# Patient Record
Sex: FEMALE | Race: WHITE | Employment: STUDENT | URBAN - METROPOLITAN AREA
[De-identification: names, ages, dates, MRNs, and addresses within clinical notes are randomized per-mention and may not be internally consistent; named-entity substitution may affect disease eponyms.]

---

## 2017-03-11 ENCOUNTER — APPOINTMENT (OUTPATIENT)
Dept: GENERAL RADIOLOGY | Age: 42
End: 2017-03-11
Attending: EMERGENCY MEDICINE
Payer: COMMERCIAL

## 2017-03-11 ENCOUNTER — HOSPITAL ENCOUNTER (EMERGENCY)
Age: 42
Discharge: HOME | End: 2017-03-11
Attending: EMERGENCY MEDICINE
Payer: COMMERCIAL

## 2017-03-11 VITALS
RESPIRATION RATE: 19 BRPM | BODY MASS INDEX: 27.78 KG/M2 | DIASTOLIC BLOOD PRESSURE: 90 MMHG | TEMPERATURE: 97.8 F | HEIGHT: 67 IN | WEIGHT: 177 LBS | OXYGEN SATURATION: 97 % | HEART RATE: 86 BPM | SYSTOLIC BLOOD PRESSURE: 129 MMHG

## 2017-03-11 DIAGNOSIS — S90.211A CONTUSION OF RIGHT GREAT TOE WITH DAMAGE TO NAIL, INITIAL ENCOUNTER: Primary | ICD-10-CM

## 2017-03-11 PROCEDURE — 74011250637 HC RX REV CODE- 250/637: Performed by: EMERGENCY MEDICINE

## 2017-03-11 PROCEDURE — 73660 X-RAY EXAM OF TOE(S): CPT

## 2017-03-11 PROCEDURE — 99283 EMERGENCY DEPT VISIT LOW MDM: CPT | Performed by: EMERGENCY MEDICINE

## 2017-03-11 PROCEDURE — 74011250636 HC RX REV CODE- 250/636: Performed by: EMERGENCY MEDICINE

## 2017-03-11 PROCEDURE — 96372 THER/PROPH/DIAG INJ SC/IM: CPT | Performed by: EMERGENCY MEDICINE

## 2017-03-11 RX ORDER — TRAMADOL HYDROCHLORIDE 50 MG/1
50 TABLET ORAL
Qty: 20 TAB | Refills: 0 | Status: SHIPPED | OUTPATIENT
Start: 2017-03-11 | End: 2019-10-15 | Stop reason: CLARIF

## 2017-03-11 RX ORDER — OMEPRAZOLE 20 MG/1
20 CAPSULE, DELAYED RELEASE ORAL DAILY
COMMUNITY
End: 2019-10-15 | Stop reason: CLARIF

## 2017-03-11 RX ORDER — TRAMADOL HYDROCHLORIDE 50 MG/1
50 TABLET ORAL
Status: COMPLETED | OUTPATIENT
Start: 2017-03-11 | End: 2017-03-11

## 2017-03-11 RX ORDER — HYDROCODONE BITARTRATE AND ACETAMINOPHEN 7.5; 325 MG/1; MG/1
1 TABLET ORAL
Status: COMPLETED | OUTPATIENT
Start: 2017-03-11 | End: 2017-03-11

## 2017-03-11 RX ORDER — KETOROLAC TROMETHAMINE 30 MG/ML
60 INJECTION, SOLUTION INTRAMUSCULAR; INTRAVENOUS
Status: COMPLETED | OUTPATIENT
Start: 2017-03-11 | End: 2017-03-11

## 2017-03-11 RX ADMIN — HYDROCODONE BITARTRATE AND ACETAMINOPHEN 1 TABLET: 7.5; 325 TABLET ORAL at 23:35

## 2017-03-11 RX ADMIN — TRAMADOL HYDROCHLORIDE 50 MG: 50 TABLET, FILM COATED ORAL at 23:27

## 2017-03-11 RX ADMIN — KETOROLAC TROMETHAMINE 60 MG: 30 INJECTION, SOLUTION INTRAMUSCULAR at 23:27

## 2017-03-12 NOTE — ED TRIAGE NOTES
Pt in c/o right great toe pain after dropping chair on it Thursday. Pt states bruising and swelling to toe. Pt in cast shoe on triage. Pt ambulatory to triage with limp.

## 2017-03-12 NOTE — ED PROVIDER NOTES
Patient is a 35 y.o. female presenting with toe pain. The history is provided by the patient. Toe Pain    This is a new problem. The current episode started 2 days ago. The problem occurs constantly. The problem has not changed since onset. The pain is present in the right toes. The quality of the pain is described as aching. The pain is at a severity of 8/10. The pain is severe. Pertinent negatives include no numbness, full range of motion, no stiffness, no tingling and no itching. The symptoms are aggravated by standing. She has tried OTC pain medications for the symptoms. The treatment provided no relief. There has been a history of trauma. Past Medical History:   Diagnosis Date    Achilles tendon sprain     right     Anal fissure 1/20/2015    Anxiety     BRCA negative     Endometriosis     Ines Barr virus positive mononucleosis syndrome     12-11    Insomnia     Obesity (BMI 30.0-34. 9)        Past Surgical History:   Procedure Laterality Date    HX ACL RECONSTRUCTION  2009    RIGHT    HX ACL RECONSTRUCTION  2014    right knee    HX APPENDECTOMY  2002    HX CHOLECYSTECTOMY  1999    [facility]    dx lap    HX KNEE ARTHROSCOPY  10/12    L knee scope    HX LUMBAR FUSION  2006    L5, S1 discectomy/fusion with a frontal approach    HX ORTHOPAEDIC  5/2011    left foot surg    HX OTHER SURGICAL  2004    left hand I&D    HX OTHER SURGICAL  5/2013    EUA- sphincterectomy    HX TOTAL LAPAROSCOPIC HYSTERECTOMY W/ BS&O  2/2013    hemmorhoidectomy    KNEE ARTHROSCP HARV  x 2 1990, 1993    RIGHT         Family History:   Problem Relation Age of Onset    Breast Cancer Mother 62    Breast Cancer Maternal Grandmother 64    Ovarian Cancer Other 39     Maternal great aunt    Malignant Hyperthermia Neg Hx     Pseudocholinesterase Deficiency Neg Hx     Delayed Awakening Neg Hx     Post-op Nausea/Vomiting Neg Hx     Emergence Delirium Neg Hx     Post-op Cognitive Dysfunction Neg Hx     Other Neg Hx     Colon Cancer Neg Hx        Social History     Social History    Marital status:      Spouse name: N/A    Number of children: N/A    Years of education: N/A     Occupational History    Not on file. Social History Main Topics    Smoking status: Never Smoker    Smokeless tobacco: Never Used    Alcohol use No    Drug use: No    Sexual activity: Yes     Partners: Male     Birth control/ protection: Surgical     Other Topics Concern    Not on file     Social History Narrative         ALLERGIES: Clomiphene    Review of Systems   Musculoskeletal: Negative for stiffness. Skin: Negative for itching. Neurological: Negative for tingling and numbness. All other systems reviewed and are negative. Vitals:    03/11/17 2117   BP: 129/90   Pulse: 86   Resp: 19   Temp: 97.8 °F (36.6 °C)   SpO2: 97%   Weight: 80.3 kg (177 lb)   Height: 5' 7\" (1.702 m)            Physical Exam   Constitutional: She is oriented to person, place, and time. She appears well-developed and well-nourished. No distress. HENT:   Head: Normocephalic and atraumatic. Eyes: Conjunctivae and EOM are normal. Pupils are equal, round, and reactive to light. Musculoskeletal: Normal range of motion. She exhibits edema and tenderness. She exhibits no deformity. Bruising noted at the base of the right great toe no deformities are noted unable to assess for subungual hematoma due to toenail polish. The patient denies any pain in the area of the toenail   Neurological: She is alert and oriented to person, place, and time. Skin: Skin is warm and dry. Psychiatric: She has a normal mood and affect. Her behavior is normal.   Nursing note and vitals reviewed.        MDM  Number of Diagnoses or Management Options  Contusion of right great toe with damage to nail, initial encounter:      Amount and/or Complexity of Data Reviewed  Tests in the radiology section of CPT®: ordered and reviewed (X-rays negative for fracture)    Risk of Complications, Morbidity, and/or Mortality  Presenting problems: low  Diagnostic procedures: low  Management options: low  General comments: Patient has a cast shoe    Patient Progress  Patient progress: stable    ED Course       Procedures

## 2018-12-11 ENCOUNTER — APPOINTMENT (OUTPATIENT)
Dept: CT IMAGING | Age: 43
End: 2018-12-11
Attending: EMERGENCY MEDICINE
Payer: COMMERCIAL

## 2018-12-11 ENCOUNTER — HOSPITAL ENCOUNTER (EMERGENCY)
Age: 43
Discharge: HOME | End: 2018-12-11
Attending: EMERGENCY MEDICINE
Payer: COMMERCIAL

## 2018-12-11 VITALS
WEIGHT: 190 LBS | RESPIRATION RATE: 24 BRPM | HEIGHT: 67 IN | OXYGEN SATURATION: 100 % | HEART RATE: 88 BPM | TEMPERATURE: 98.7 F | SYSTOLIC BLOOD PRESSURE: 127 MMHG | DIASTOLIC BLOOD PRESSURE: 83 MMHG | BODY MASS INDEX: 29.82 KG/M2

## 2018-12-11 DIAGNOSIS — R56.9 SEIZURE (HCC): Primary | ICD-10-CM

## 2018-12-11 LAB
ALBUMIN SERPL-MCNC: 3.3 G/DL (ref 3.5–5)
ALBUMIN/GLOB SERPL: 0.9 {RATIO}
ALP SERPL-CCNC: 78 U/L (ref 50–130)
ALT SERPL-CCNC: 16 U/L (ref 12–65)
ANION GAP SERPL CALC-SCNC: 7 MMOL/L
AST SERPL-CCNC: 16 U/L (ref 15–37)
ATRIAL RATE: 91 BPM
BASOPHILS # BLD: 0 K/UL (ref 0–0.2)
BASOPHILS NFR BLD: 0 % (ref 0–2)
BILIRUB SERPL-MCNC: 0.2 MG/DL (ref 0.2–1.1)
BUN SERPL-MCNC: 15 MG/DL (ref 6–23)
CALCIUM SERPL-MCNC: 8.4 MG/DL (ref 8.3–10.4)
CALCULATED P AXIS, ECG09: 69 DEGREES
CALCULATED R AXIS, ECG10: 87 DEGREES
CALCULATED T AXIS, ECG11: 73 DEGREES
CHLORIDE SERPL-SCNC: 102 MMOL/L (ref 98–107)
CO2 SERPL-SCNC: 30 MMOL/L (ref 21–32)
CREAT SERPL-MCNC: 0.9 MG/DL (ref 0.6–1)
DIAGNOSIS, 93000: NORMAL
DIFFERENTIAL METHOD BLD: ABNORMAL
EOSINOPHIL # BLD: 0.1 K/UL (ref 0–0.8)
EOSINOPHIL NFR BLD: 2 % (ref 0.5–7.8)
ERYTHROCYTE [DISTWIDTH] IN BLOOD BY AUTOMATED COUNT: 12.9 % (ref 11.9–14.6)
GLOBULIN SER CALC-MCNC: 3.5 G/DL (ref 2.3–3.5)
GLUCOSE SERPL-MCNC: 111 MG/DL (ref 65–100)
HCT VFR BLD AUTO: 39.9 % (ref 35.8–46.3)
HGB BLD-MCNC: 12.9 G/DL (ref 11.7–15.4)
IMM GRANULOCYTES # BLD: 0 K/UL (ref 0–0.5)
IMM GRANULOCYTES NFR BLD AUTO: 0 % (ref 0–5)
LYMPHOCYTES # BLD: 1.3 K/UL (ref 0.5–4.6)
LYMPHOCYTES NFR BLD: 17 % (ref 13–44)
MAGNESIUM SERPL-MCNC: 2.2 MG/DL (ref 1.8–2.4)
MCH RBC QN AUTO: 27.3 PG (ref 26.1–32.9)
MCHC RBC AUTO-ENTMCNC: 32.3 G/DL (ref 31.4–35)
MCV RBC AUTO: 84.5 FL (ref 79.6–97.8)
MONOCYTES # BLD: 0.4 K/UL (ref 0.1–1.3)
MONOCYTES NFR BLD: 5 % (ref 4–12)
NEUTS SEG # BLD: 5.6 K/UL (ref 1.7–8.2)
NEUTS SEG NFR BLD: 76 % (ref 43–78)
NRBC # BLD: 0 K/UL (ref 0–0.2)
P-R INTERVAL, ECG05: 158 MS
PLATELET # BLD AUTO: 271 K/UL (ref 150–450)
PMV BLD AUTO: 9.2 FL (ref 9.4–12.3)
POTASSIUM SERPL-SCNC: 4.4 MMOL/L (ref 3.5–5.1)
PROT SERPL-MCNC: 6.8 G/DL
Q-T INTERVAL, ECG07: 358 MS
QRS DURATION, ECG06: 102 MS
QTC CALCULATION (BEZET), ECG08: 440 MS
RBC # BLD AUTO: 4.72 M/UL (ref 4.05–5.2)
SODIUM SERPL-SCNC: 139 MMOL/L (ref 136–145)
TROPONIN I BLD-MCNC: 0 NG/ML (ref 0.02–0.05)
VENTRICULAR RATE, ECG03: 91 BPM
WBC # BLD AUTO: 7.4 K/UL (ref 4.3–11.1)

## 2018-12-11 PROCEDURE — 70450 CT HEAD/BRAIN W/O DYE: CPT

## 2018-12-11 PROCEDURE — 94762 N-INVAS EAR/PLS OXIMTRY CONT: CPT | Performed by: EMERGENCY MEDICINE

## 2018-12-11 PROCEDURE — 74011250637 HC RX REV CODE- 250/637: Performed by: EMERGENCY MEDICINE

## 2018-12-11 PROCEDURE — 84484 ASSAY OF TROPONIN QUANT: CPT

## 2018-12-11 PROCEDURE — 83735 ASSAY OF MAGNESIUM: CPT

## 2018-12-11 PROCEDURE — 80053 COMPREHEN METABOLIC PANEL: CPT

## 2018-12-11 PROCEDURE — 99285 EMERGENCY DEPT VISIT HI MDM: CPT | Performed by: EMERGENCY MEDICINE

## 2018-12-11 PROCEDURE — 85025 COMPLETE CBC W/AUTO DIFF WBC: CPT

## 2018-12-11 PROCEDURE — 81003 URINALYSIS AUTO W/O SCOPE: CPT | Performed by: EMERGENCY MEDICINE

## 2018-12-11 PROCEDURE — 93005 ELECTROCARDIOGRAM TRACING: CPT | Performed by: EMERGENCY MEDICINE

## 2018-12-11 RX ORDER — MIRTAZAPINE 30 MG/1
TABLET, FILM COATED ORAL
COMMUNITY
End: 2019-10-15 | Stop reason: CLARIF

## 2018-12-11 RX ORDER — SODIUM CHLORIDE 0.9 % (FLUSH) 0.9 %
5-10 SYRINGE (ML) INJECTION AS NEEDED
Status: DISCONTINUED | OUTPATIENT
Start: 2018-12-11 | End: 2018-12-11 | Stop reason: HOSPADM

## 2018-12-11 RX ORDER — SODIUM CHLORIDE 0.9 % (FLUSH) 0.9 %
5-10 SYRINGE (ML) INJECTION EVERY 8 HOURS
Status: DISCONTINUED | OUTPATIENT
Start: 2018-12-11 | End: 2018-12-11 | Stop reason: HOSPADM

## 2018-12-11 RX ORDER — LEVETIRACETAM 500 MG/1
500 TABLET ORAL 2 TIMES DAILY
Qty: 60 TAB | Refills: 1 | Status: SHIPPED | OUTPATIENT
Start: 2018-12-11 | End: 2019-10-15 | Stop reason: CLARIF

## 2018-12-11 RX ORDER — LEVETIRACETAM 500 MG/1
500 TABLET ORAL
Status: COMPLETED | OUTPATIENT
Start: 2018-12-11 | End: 2018-12-11

## 2018-12-11 RX ADMIN — LEVETIRACETAM 500 MG: 500 TABLET, FILM COATED ORAL at 17:09

## 2018-12-11 NOTE — ED NOTES
I have reviewed discharge instructions with the patient and spouse. The patient and spouse verbalized understanding. Patient left ED via Discharge Method: ambulatory to Home with spouse. Opportunity for questions and clarification provided. Patient given 1 scripts. To continue your aftercare when you leave the hospital, you may receive an automated call from our care team to check in on how you are doing. This is a free service and part of our promise to provide the best care and service to meet your aftercare needs.  If you have questions, or wish to unsubscribe from this service please call [phone]. Thank you for Choosing our [facility].

## 2018-12-11 NOTE — DISCHARGE INSTRUCTIONS

## 2018-12-11 NOTE — ED TRIAGE NOTES
Patient with first time seizure lasting ab out 35-45 seconds per  who witnessed patient. Patient denies any complaints of pain at this time. Patient with BGL at 174 mg/dl with EMS. Patient advises gastric sleeve couple years ago, and was having some constipation prior. Patient with multiple medications changes recently for insomnia. Patient alert and oriented at this time.

## 2018-12-11 NOTE — PROGRESS NOTES
Initial visit by  in ER to convey care and concern and encourage patient that  services are available if desired. No needs were voiced during the visit.      Magen Stern 66  Board Certified

## 2018-12-11 NOTE — ED PROVIDER NOTES
The history is provided by the patient and the spouse. The history is limited by the condition of the patient. Seizure    This is a new problem. The current episode started 1 to 2 hours ago. The problem has been resolved. There was 1 seizure. The most recent episode lasted 30 to 120 seconds. Associated symptoms include vomiting (15 minutes before onset of seizure; h/o gastric sleeve and intermittent problems with this for years. ). Pertinent negatives include no confusion, no headaches, no speech difficulty, no visual disturbance, no neck stiffness, no sore throat, no chest pain, no cough, no diarrhea and no muscle weakness. Characteristics include rhythmic jerking, loss of consciousness, apnea and cyanosis (perioral). Characteristics do not include eye blinking, eye deviation, bowel incontinence, bladder incontinence or bit tongue. The episode was witnessed. There was no sensation of an aura present. The seizures did not continue in the ED. The seizure(s) had no focality. Possible causes include sleep deprivation (chronic and unchanged). Possible causes do not include medication or dosage change, missed seizure meds, recent illness, change in alcohol use, stress, head injury or missed seizure meds. There has been no fever. She reports vomiting (15 minutes before onset of seizure; h/o gastric sleeve and intermittent problems with this for years. ). She reports no chest pain, no confusion, no visual disturbance, no diarrhea, no headaches, no sore throat, no muscle weakness, no stiff neck, no speech difficulty, and no cough. There were no medications administered prior to arrival. Home seizure medications include: no seizure medications. Past Medical History:   Diagnosis Date    Achilles tendon sprain     right     Anal fissure 1/20/2015    Anxiety     BRCA negative     Endometriosis     Ines Barr virus positive mononucleosis syndrome     12-11    Insomnia     Obesity (BMI 30.0-34. 9)        Past Surgical History:   Procedure Laterality Date    HX ACL RECONSTRUCTION  2009    RIGHT    HX ACL RECONSTRUCTION  2014    right knee    HX APPENDECTOMY  2002    HX CHOLECYSTECTOMY  1999    HX GASTRIC BYPASS      gastric sleeve    HX HYSTERECTOMY      HX KNEE ARTHROSCOPY  10/12    L knee scope    HX LUMBAR FUSION  2006    L5, S1 discectomy/fusion with a frontal approach    HX ORTHOPAEDIC  5/2011    left foot surg    HX OTHER SURGICAL  2004    left hand I&D    HX OTHER SURGICAL  5/2013    EUA- sphincterectomy    HX TOTAL LAPAROSCOPIC HYSTERECTOMY W/ BS&O  2/2013    hemmorhoidectomy    KNEE ARTHROSCP HARV  x 2 1990, 1993    RIGHT         Family History:   Problem Relation Age of Onset    Breast Cancer Mother 62    Breast Cancer Maternal Grandmother 64    Ovarian Cancer Other 39        Maternal great aunt   [facility] Malignant Hyperthermia Neg Hx     Pseudocholinesterase Deficiency Neg Hx     Delayed Awakening Neg Hx     Post-op Nausea/Vomiting Neg Hx     Emergence Delirium Neg Hx     Post-op Cognitive Dysfunction Neg Hx     Other Neg Hx     Colon Cancer Neg Hx        Social History     Socioeconomic History    Marital status:      Spouse name: Not on file    Number of children: Not on file    Years of education: Not on file    Highest education level: Not on file   Social Needs    Financial resource strain: Not on file    Food insecurity - worry: Not on file    Food insecurity - inability: Not on file   [employer] needs - medical: Not on file   [employer] needs - non-medical: Not on file   Occupational History    Not on file   Tobacco Use    Smoking status: Never Smoker    Smokeless tobacco: Never Used   Substance and Sexual Activity    Alcohol use: Yes     Comment: rare wine    Drug use: No    Sexual activity: Yes     Partners: Male     Birth control/protection: Surgical   Other Topics Concern    Not on file   Social History Narrative    Not on file         ALLERGIES: Clomiphene    Review of Systems   Constitutional: Negative for diaphoresis and fever. HENT: Negative for sore throat. Eyes: Negative for visual disturbance. Respiratory: Positive for apnea. Negative for cough. Cardiovascular: Positive for cyanosis (perioral). Negative for chest pain. Gastrointestinal: Positive for vomiting (15 minutes before onset of seizure; h/o gastric sleeve and intermittent problems with this for years. ). Negative for abdominal pain, bowel incontinence and diarrhea. Genitourinary: Negative for bladder incontinence. Musculoskeletal: Negative for back pain. Neurological: Positive for loss of consciousness. Negative for speech difficulty, weakness and headaches. Psychiatric/Behavioral: Negative for confusion. All other systems reviewed and are negative. Vitals:    12/11/18 1402   BP: 114/77   Pulse: 100   Resp: 16   Temp: 99 °F (37.2 °C)   SpO2: 96%   Weight: 86.2 kg (190 lb)   Height: 5' 7\" (1.702 m)            Physical Exam   Constitutional: She is oriented to person, place, and time. She appears well-developed and well-nourished. No distress. HENT:   Head: Normocephalic and atraumatic. Right Ear: External ear normal.   Left Ear: External ear normal.   Mouth/Throat: Oropharynx is clear and moist.   Eyes: Conjunctivae and EOM are normal. Pupils are equal, round, and reactive to light. Neck: Normal range of motion. Neck supple. Cardiovascular: Normal rate, regular rhythm, normal heart sounds and intact distal pulses. Pulmonary/Chest: Effort normal and breath sounds normal.   Abdominal: Soft. Bowel sounds are normal. There is no tenderness. Musculoskeletal: Normal range of motion. She exhibits no edema. Neurological: She is alert and oriented to person, place, and time. She has normal strength. She displays normal reflexes. No cranial nerve deficit or sensory deficit. She exhibits normal muscle tone.  Coordination normal.   Negative pronator drift   Skin: Skin is warm and dry. Capillary refill takes less than 2 seconds. Psychiatric: She has a normal mood and affect. Her speech is normal.   Nursing note and vitals reviewed. MDM  Number of Diagnoses or Management Options  Seizure [facility]): new and requires workup     Amount and/or Complexity of Data Reviewed  Clinical lab tests: ordered and reviewed  Tests in the radiology section of CPT®: ordered and reviewed  Obtain history from someone other than the patient: yes  Review and summarize past medical records: yes  Independent visualization of images, tracings, or specimens: yes    Risk of Complications, Morbidity, and/or Mortality  Presenting problems: high  Diagnostic procedures: moderate  Management options: moderate    Patient Progress  Patient progress: improved         Procedures    Results Reviewed:      Recent Results (from the past 24 hour(s))   CBC WITH AUTOMATED DIFF    Collection Time: 12/11/18  3:28 PM   Result Value Ref Range    WBC 7.4 4.3 - 11.1 K/uL    RBC 4.72 4.05 - 5.2 M/uL    HGB 12.9 11.7 - 15.4 g/dL    HCT 39.9 35.8 - 46.3 %    MCV 84.5 79.6 - 97.8 FL    MCH 27.3 26.1 - 32.9 PG    MCHC 32.3 31.4 - 35.0 g/dL    RDW 12.9 11.9 - 14.6 %    PLATELET 146 093 - 697 K/uL    MPV 9.2 (L) 9.4 - 12.3 FL    ABSOLUTE NRBC 0.00 0.0 - 0.2 K/uL    DF AUTOMATED      NEUTROPHILS 76 43 - 78 %    LYMPHOCYTES 17 13 - 44 %    MONOCYTES 5 4.0 - 12.0 %    EOSINOPHILS 2 0.5 - 7.8 %    BASOPHILS 0 0.0 - 2.0 %    IMMATURE GRANULOCYTES 0 0.0 - 5.0 %    ABS. NEUTROPHILS 5.6 1.7 - 8.2 K/UL    ABS. LYMPHOCYTES 1.3 0.5 - 4.6 K/UL    ABS. MONOCYTES 0.4 0.1 - 1.3 K/UL    ABS. EOSINOPHILS 0.1 0.0 - 0.8 K/UL    ABS. BASOPHILS 0.0 0.0 - 0.2 K/UL    ABS. IMM.  GRANS. 0.0 0.0 - 0.5 K/UL   METABOLIC PANEL, COMPREHENSIVE    Collection Time: 12/11/18  3:28 PM   Result Value Ref Range    Sodium 139 136 - 145 mmol/L    Potassium 4.4 3.5 - 5.1 mmol/L    Chloride 102 98 - 107 mmol/L    CO2 30 21 - 32 mmol/L    Anion gap 7 mmol/L    Glucose 111 (H) 65 - 100 mg/dL    BUN 15 6 - 23 MG/DL    Creatinine 0.90 0.6 - 1.0 MG/DL    GFR est AA >60 >60 ml/min/1.73m2    GFR est non-AA >60 ml/min/1.73m2    Calcium 8.4 8.3 - 10.4 MG/DL    Bilirubin, total 0.2 0.2 - 1.1 MG/DL    ALT (SGPT) 16 12 - 65 U/L    AST (SGOT) 16 15 - 37 U/L    Alk. phosphatase 78 50 - 130 U/L    Protein, total 6.8 g/dL    Albumin 3.3 (L) 3.5 - 5.0 g/dL    Globulin 3.5 2.3 - 3.5 g/dL    A-G Ratio 0.9     MAGNESIUM    Collection Time: 12/11/18  3:28 PM   Result Value Ref Range    Magnesium 2.2 1.8 - 2.4 mg/dL   POC TROPONIN-I    Collection Time: 12/11/18  3:38 PM   Result Value Ref Range    Troponin-I (POC) 0 (L) 0.02 - 0.05 ng/ml     CT HEAD WO CONT   Final Result   IMPRESSION:  Negative for acute intracranial abnormality. I discussed the results of all labs, procedures, radiographs, and treatments with the patient and available family. Treatment plan is agreed upon and the patient is ready for discharge. All voiced understanding of the discharge plan and medication instructions or changes as appropriate. Questions about treatment in the ED were answered. All were encouraged to return should symptoms worsen or new problems develop.

## 2019-10-16 ENCOUNTER — ANESTHESIA EVENT (OUTPATIENT)
Dept: SURGERY | Age: 44
End: 2019-10-16
Payer: COMMERCIAL

## 2019-10-16 ENCOUNTER — HOSPITAL ENCOUNTER (OUTPATIENT)
Age: 44
Setting detail: OUTPATIENT SURGERY
Discharge: HOME | End: 2019-10-16
Attending: ORTHOPAEDIC SURGERY | Admitting: ORTHOPAEDIC SURGERY
Payer: COMMERCIAL

## 2019-10-16 ENCOUNTER — ANESTHESIA (OUTPATIENT)
Dept: SURGERY | Age: 44
End: 2019-10-16
Payer: COMMERCIAL

## 2019-10-16 VITALS
RESPIRATION RATE: 16 BRPM | OXYGEN SATURATION: 95 % | SYSTOLIC BLOOD PRESSURE: 114 MMHG | DIASTOLIC BLOOD PRESSURE: 65 MMHG | HEART RATE: 57 BPM | WEIGHT: 188 LBS | BODY MASS INDEX: 29.44 KG/M2 | TEMPERATURE: 98 F

## 2019-10-16 PROCEDURE — 77030018836 HC SOL IRR NACL ICUM -A: Performed by: ORTHOPAEDIC SURGERY

## 2019-10-16 PROCEDURE — 77030040936 HC WND COBLATN S&N -C: Performed by: ORTHOPAEDIC SURGERY

## 2019-10-16 PROCEDURE — 77030000032 HC CUF TRNQT ZIMM -B: Performed by: ORTHOPAEDIC SURGERY

## 2019-10-16 PROCEDURE — 76060000032 HC ANESTHESIA 0.5 TO 1 HR: Performed by: ORTHOPAEDIC SURGERY

## 2019-10-16 PROCEDURE — 76210000020 HC REC RM PH II FIRST 0.5 HR: Performed by: ORTHOPAEDIC SURGERY

## 2019-10-16 PROCEDURE — 77030006668 HC BLD SHV MENSCS STRY -B: Performed by: ORTHOPAEDIC SURGERY

## 2019-10-16 PROCEDURE — 77030010509 HC AIRWY LMA MSK TELE -A: Performed by: ANESTHESIOLOGY

## 2019-10-16 PROCEDURE — 74011250637 HC RX REV CODE- 250/637: Performed by: ANESTHESIOLOGY

## 2019-10-16 PROCEDURE — 76010000138 HC OR TIME 0.5 TO 1 HR: Performed by: ORTHOPAEDIC SURGERY

## 2019-10-16 PROCEDURE — 74011250636 HC RX REV CODE- 250/636: Performed by: ORTHOPAEDIC SURGERY

## 2019-10-16 PROCEDURE — 74011250636 HC RX REV CODE- 250/636

## 2019-10-16 PROCEDURE — 74011000250 HC RX REV CODE- 250

## 2019-10-16 PROCEDURE — 74011250636 HC RX REV CODE- 250/636: Performed by: ANESTHESIOLOGY

## 2019-10-16 PROCEDURE — 77030006590 HC BLD ARTHSC GRFT J&J -C: Performed by: ORTHOPAEDIC SURGERY

## 2019-10-16 PROCEDURE — 74011250636 HC RX REV CODE- 250/636: Performed by: PHYSICIAN ASSISTANT

## 2019-10-16 PROCEDURE — 76210000063 HC OR PH I REC FIRST 0.5 HR: Performed by: ORTHOPAEDIC SURGERY

## 2019-10-16 RX ORDER — ONDANSETRON 2 MG/ML
4 INJECTION INTRAMUSCULAR; INTRAVENOUS ONCE
Status: DISCONTINUED | OUTPATIENT
Start: 2019-10-16 | End: 2019-10-16 | Stop reason: HOSPADM

## 2019-10-16 RX ORDER — LIDOCAINE HYDROCHLORIDE 10 MG/ML
0.1 INJECTION INFILTRATION; PERINEURAL AS NEEDED
Status: DISCONTINUED | OUTPATIENT
Start: 2019-10-16 | End: 2019-10-16 | Stop reason: HOSPADM

## 2019-10-16 RX ORDER — CEFAZOLIN SODIUM/WATER 2 G/20 ML
2 SYRINGE (ML) INTRAVENOUS ONCE
Status: COMPLETED | OUTPATIENT
Start: 2019-10-16 | End: 2019-10-16

## 2019-10-16 RX ORDER — DEXAMETHASONE SODIUM PHOSPHATE 4 MG/ML
INJECTION, SOLUTION INTRA-ARTICULAR; INTRALESIONAL; INTRAMUSCULAR; INTRAVENOUS; SOFT TISSUE AS NEEDED
Status: DISCONTINUED | OUTPATIENT
Start: 2019-10-16 | End: 2019-10-16 | Stop reason: HOSPADM

## 2019-10-16 RX ORDER — MIDAZOLAM HYDROCHLORIDE 1 MG/ML
2 INJECTION, SOLUTION INTRAMUSCULAR; INTRAVENOUS
Status: DISCONTINUED | OUTPATIENT
Start: 2019-10-16 | End: 2019-10-16 | Stop reason: HOSPADM

## 2019-10-16 RX ORDER — LIDOCAINE HYDROCHLORIDE 20 MG/ML
INJECTION, SOLUTION EPIDURAL; INFILTRATION; INTRACAUDAL; PERINEURAL AS NEEDED
Status: DISCONTINUED | OUTPATIENT
Start: 2019-10-16 | End: 2019-10-16 | Stop reason: HOSPADM

## 2019-10-16 RX ORDER — ROPIVACAINE HYDROCHLORIDE 5 MG/ML
INJECTION, SOLUTION EPIDURAL; INFILTRATION; PERINEURAL AS NEEDED
Status: DISCONTINUED | OUTPATIENT
Start: 2019-10-16 | End: 2019-10-16 | Stop reason: HOSPADM

## 2019-10-16 RX ORDER — OXYCODONE HYDROCHLORIDE 5 MG/1
10 TABLET ORAL
Status: DISCONTINUED | OUTPATIENT
Start: 2019-10-16 | End: 2019-10-16 | Stop reason: HOSPADM

## 2019-10-16 RX ORDER — SODIUM CHLORIDE, SODIUM LACTATE, POTASSIUM CHLORIDE, CALCIUM CHLORIDE 600; 310; 30; 20 MG/100ML; MG/100ML; MG/100ML; MG/100ML
100 INJECTION, SOLUTION INTRAVENOUS CONTINUOUS
Status: DISCONTINUED | OUTPATIENT
Start: 2019-10-16 | End: 2019-10-16 | Stop reason: HOSPADM

## 2019-10-16 RX ORDER — ONDANSETRON 2 MG/ML
INJECTION INTRAMUSCULAR; INTRAVENOUS AS NEEDED
Status: DISCONTINUED | OUTPATIENT
Start: 2019-10-16 | End: 2019-10-16 | Stop reason: HOSPADM

## 2019-10-16 RX ORDER — FENTANYL CITRATE 50 UG/ML
100 INJECTION, SOLUTION INTRAMUSCULAR; INTRAVENOUS ONCE
Status: DISCONTINUED | OUTPATIENT
Start: 2019-10-16 | End: 2019-10-16 | Stop reason: HOSPADM

## 2019-10-16 RX ORDER — PROPOFOL 10 MG/ML
INJECTION, EMULSION INTRAVENOUS AS NEEDED
Status: DISCONTINUED | OUTPATIENT
Start: 2019-10-16 | End: 2019-10-16 | Stop reason: HOSPADM

## 2019-10-16 RX ORDER — DIPHENHYDRAMINE HYDROCHLORIDE 50 MG/ML
12.5 INJECTION, SOLUTION INTRAMUSCULAR; INTRAVENOUS
Status: DISCONTINUED | OUTPATIENT
Start: 2019-10-16 | End: 2019-10-16 | Stop reason: HOSPADM

## 2019-10-16 RX ORDER — MIDAZOLAM HYDROCHLORIDE 1 MG/ML
2 INJECTION, SOLUTION INTRAMUSCULAR; INTRAVENOUS ONCE
Status: COMPLETED | OUTPATIENT
Start: 2019-10-16 | End: 2019-10-16

## 2019-10-16 RX ORDER — NALOXONE HYDROCHLORIDE 0.4 MG/ML
0.1 INJECTION, SOLUTION INTRAMUSCULAR; INTRAVENOUS; SUBCUTANEOUS AS NEEDED
Status: DISCONTINUED | OUTPATIENT
Start: 2019-10-16 | End: 2019-10-16 | Stop reason: HOSPADM

## 2019-10-16 RX ORDER — ALBUTEROL SULFATE 0.83 MG/ML
2.5 SOLUTION RESPIRATORY (INHALATION) AS NEEDED
Status: DISCONTINUED | OUTPATIENT
Start: 2019-10-16 | End: 2019-10-16 | Stop reason: HOSPADM

## 2019-10-16 RX ORDER — FENTANYL CITRATE 50 UG/ML
INJECTION, SOLUTION INTRAMUSCULAR; INTRAVENOUS AS NEEDED
Status: DISCONTINUED | OUTPATIENT
Start: 2019-10-16 | End: 2019-10-16 | Stop reason: HOSPADM

## 2019-10-16 RX ORDER — OXYCODONE HYDROCHLORIDE 5 MG/1
5 TABLET ORAL
Status: COMPLETED | OUTPATIENT
Start: 2019-10-16 | End: 2019-10-16

## 2019-10-16 RX ORDER — HYDROMORPHONE HYDROCHLORIDE 2 MG/ML
0.5 INJECTION, SOLUTION INTRAMUSCULAR; INTRAVENOUS; SUBCUTANEOUS
Status: DISCONTINUED | OUTPATIENT
Start: 2019-10-16 | End: 2019-10-16 | Stop reason: HOSPADM

## 2019-10-16 RX ADMIN — HYDROMORPHONE HYDROCHLORIDE 0.5 MG: 2 INJECTION INTRAMUSCULAR; INTRAVENOUS; SUBCUTANEOUS at 12:31

## 2019-10-16 RX ADMIN — FENTANYL CITRATE 25 MCG: 50 INJECTION, SOLUTION INTRAMUSCULAR; INTRAVENOUS at 11:58

## 2019-10-16 RX ADMIN — SODIUM CHLORIDE, SODIUM LACTATE, POTASSIUM CHLORIDE, AND CALCIUM CHLORIDE 100 ML/HR: 600; 310; 30; 20 INJECTION, SOLUTION INTRAVENOUS at 09:45

## 2019-10-16 RX ADMIN — Medication 2 G: at 11:25

## 2019-10-16 RX ADMIN — DEXAMETHASONE SODIUM PHOSPHATE 4 MG: 4 INJECTION, SOLUTION INTRA-ARTICULAR; INTRALESIONAL; INTRAMUSCULAR; INTRAVENOUS; SOFT TISSUE at 11:19

## 2019-10-16 RX ADMIN — FENTANYL CITRATE 50 MCG: 50 INJECTION, SOLUTION INTRAMUSCULAR; INTRAVENOUS at 11:17

## 2019-10-16 RX ADMIN — MIDAZOLAM 2 MG: 1 INJECTION INTRAMUSCULAR; INTRAVENOUS at 10:52

## 2019-10-16 RX ADMIN — PROPOFOL 200 MG: 10 INJECTION, EMULSION INTRAVENOUS at 11:19

## 2019-10-16 RX ADMIN — PROPOFOL 50 MG: 10 INJECTION, EMULSION INTRAVENOUS at 11:28

## 2019-10-16 RX ADMIN — LIDOCAINE HYDROCHLORIDE 50 MG: 20 INJECTION, SOLUTION EPIDURAL; INFILTRATION; INTRACAUDAL; PERINEURAL at 11:19

## 2019-10-16 RX ADMIN — FENTANYL CITRATE 25 MCG: 50 INJECTION, SOLUTION INTRAMUSCULAR; INTRAVENOUS at 11:40

## 2019-10-16 RX ADMIN — ONDANSETRON 4 MG: 2 INJECTION INTRAMUSCULAR; INTRAVENOUS at 11:37

## 2019-10-16 RX ADMIN — OXYCODONE HYDROCHLORIDE 5 MG: 5 TABLET ORAL at 12:33

## 2019-10-16 NOTE — H&P
Outpatient Surgery History and Physical:  Bryon Koch was seen and examined. CHIEF COMPLAINT:   Right knee pain. PE:     Visit Vitals  /67 (BP 1 Location: Left arm, BP Patient Position: Supine)   Pulse (!) 58   Temp 98.2 °F (36.8 °C)   Resp 18   Wt 85.3 kg (188 lb)   LMP 12/25/2012   SpO2 95%   BMI 29.44 kg/m²       Heart:   Regular rhythm      Lungs:  Are clear      Past Medical History:    Patient Active Problem List    Diagnosis    Anal fissure    Anxiety    Hemorrhoids    Dysmenorrhea       Surgical History:   Past Surgical History:   Procedure Laterality Date    HX ACL RECONSTRUCTION  2009    RIGHT    HX ACL RECONSTRUCTION Right 2014    right knee    HX APPENDECTOMY  2002    HX CHOLECYSTECTOMY  1999    HX COLECTOMY  09/2017    subtotal colectomy for idiopathic constipation    HX GASTRECTOMY      gastric sleeve    HX KNEE ARTHROSCOPY  10/12    L knee scope    HX LUMBAR FUSION  2006    L5, S1 discectomy/fusion with a frontal approach    HX ORTHOPAEDIC Left 5/2011    left foot surg    HX OTHER SURGICAL  2004    left hand I&D    HX OTHER SURGICAL  5/2013    EUA- sphincterectomy    HX TOTAL LAPAROSCOPIC HYSTERECTOMY W/ BS&O  2/2013    hemmorhoidectomy    KNEE ARTHROSCP HARV  x 2 1990, 1993    RIGHT       Social History: Patient  reports that she has never smoked. She has never used smokeless tobacco. She reports that she drinks alcohol. She reports that she does not use drugs.     Family History:   Family History   Problem Relation Age of Onset    Breast Cancer Mother 62    No Known Problems Father     No Known Problems Sister     Breast Cancer Maternal Grandmother 64    Ovarian Cancer Other 39        Maternal great aunt    Malignant Hyperthermia Neg Hx     Pseudocholinesterase Deficiency Neg Hx     Delayed Awakening Neg Hx     Post-op Nausea/Vomiting Neg Hx     Emergence Delirium Neg Hx     Post-op Cognitive Dysfunction Neg Hx     Other Neg Hx     Colon Cancer Neg Hx        Allergies: Reviewed per EMR  Allergies   Allergen Reactions    Ketorolac Hives     Migraine, dizziness  Injections only      Clomiphene Nausea and Vomiting     severe       Medications:    No current facility-administered medications on file prior to encounter. Current Outpatient Medications on File Prior to Encounter   Medication Sig    estradiol (MINIVELLE) 0.1 mg/24 hr 1 Patch by TransDERmal route two (2) times a week.  multivitamin (ONE A DAY) tablet Take 1 Tab by mouth daily. Indications: held for surgery- last dose 11/26/13       The surgery is planned for the right knee arthroscopy possible ACL reconstruction with allograft       History and physical has been reviewed. The patient has been examined. There have been no significant clinical changes since the completion of the originally dated History and Physical.  Patient identified by surgeon; surgical site was confirmed by patient and surgeon. The patient is here today for outpatient surgery. I have examined the patient, no changes are noted in the patient's medical status. Necessity for the procedure/care is still present and the history and physical above is current. See the office notes for the full long term history of the problem. Please see the recent office notes for the musculoskeletal examination.     Signed By: CRISTIAN Rome     October 16, 2019 10:46 AM

## 2019-10-16 NOTE — OP NOTES
[address] [place] [address]  OPERATIVE REPORT    Name:  Nona Sheets  MR#:  [id number]  :  1975  ACCOUNT #:  [de-identified]  DATE OF SERVICE:  10/16/2019    PREOPERATIVE DIAGNOSES:  1. 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 2.  Medial meniscal tear and chondromalacia of medial femoral condyle - medial compartment. 3.  Chondromalacia - lateral compartment, right knee. POSTOPERATIVE DIAGNOSES:  1. 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 2.  Medial meniscal tear and chondromalacia of medial femoral condyle - medial compartment. 3.  Chondromalacia - lateral compartment, right knee. PROCEDURE PERFORMED:  1. Abrasion arthroplasty of patella and chondroplasty of trochlea - patellofemoral compartment - CPT 06361.  2.  Partial medial meniscectomy and chondroplasty of medial femoral condyle - medial compartment - CPT 46161.  3.  Chondroplasty - lateral compartment - CPT 11441, right knee. SURGEON:  Jovan Mcintosh MD    ASSISTANT:  CRISTIAN Thurston    ANESTHESIA:  General.    FLUIDS:  Crystalloid. COMPLICATIONS:  None. SPECIMENS REMOVED:  None. IMPLANTS:  None. ESTIMATED BLOOD LOSS:  Minimal.    FINDINGS:  There was extensive chondromalacia throughout the knee with grade III, IV changes of the patella and lateral compartment and II, III changes of the medial femoral condyle and trochlea, 2 x 2 cm. There was tearing of the body of the medial meniscus. The ACL graft was intact with minimal fraying. Exam under anesthesia revealed no significant instability and good range of motion. DESCRIPTION OF PROCEDURE:  After informed consent, the patient was brought to the operating room and placed in the supine position. General anesthesia was administered without difficulty. Exam under anesthesia was performed with the aforementioned findings. Tourniquet was applied to the right upper thigh.   Right knee and leg were prepped and draped in sterile fashion. Tourniquet was inflated. Standard inferomedial and inferolateral portals were made for scope and instruments. The knee was then arthroscoped in a sequential manner. The aforementioned findings were noted. Attention was directed to the patellofemoral compartment. A chondroplasty of the patella and trochlea was performed. All loose cartilage flaps were removed. There were no sharp edges or unstable fragments after the chondroplasty. There was an area of exposed bone on the patella and abrasion arthroplasty was performed down to bleeding subchondral bone without difficulty. Attention was directed to the medial compartment of the knee. A partial medial meniscectomy was performed. All the torn portion was removed. At the termination of the partial medial meniscectomy, the remaining meniscal rim had a smooth contour. There were no sharp edges or unstable fragments. A chondroplasty of the medial femoral condyle was performed back to smooth stable area. Attention was directed to the lateral compartment of the knee. A chondroplasty of the lateral compartment was performed back to a smooth stable area. The knee was thoroughly irrigated. Portals were closed. Sterile dressings were applied. The patient was extubated and taken to recovery room in stable condition. CRISTIAN Martínez, assisted during the procedure. He was necessary for patient positioning during the procedure and assistance with the major portions of the operation. His presence decreased the operative time and potential complication rate.       Kelsie Severe, MD [id number]  D:  10/16/2019 12:43  T:  10/16/2019 13:13  JOB #:  [id number]

## 2019-10-16 NOTE — ANESTHESIA PREPROCEDURE EVALUATION
Relevant Problems   No relevant active problems       Anesthetic History               Review of Systems / Medical History  Patient summary reviewed, nursing notes reviewed and pertinent labs reviewed    Pulmonary                   Neuro/Psych     seizures: well controlled    Psychiatric history     Cardiovascular                  Exercise tolerance: >4 METS     GI/Hepatic/Renal                Endo/Other             Other Findings              Physical Exam    Airway  Mallampati: II  TM Distance: 4 - 6 cm  Neck ROM: normal range of motion   Mouth opening: Normal     Cardiovascular  Regular rate and rhythm,  S1 and S2 normal,  no murmur, click, rub, or gallop             Dental  No notable dental hx       Pulmonary  Breath sounds clear to auscultation               Abdominal         Other Findings            Anesthetic Plan    ASA: 2  Anesthesia type: general          Induction: Intravenous  Anesthetic plan and risks discussed with: Patient and Spouse

## 2019-10-16 NOTE — ANESTHESIA POSTPROCEDURE EVALUATION
Procedure(s):  RIGHT KNEE ARTHROSCOPY  MEDIAL MENISCECTOMY. general    Anesthesia Post Evaluation      Multimodal analgesia: multimodal analgesia used between 6 hours prior to anesthesia start to PACU discharge  Patient location during evaluation: PACU  Patient participation: complete - patient participated  Level of consciousness: awake and awake and alert  Pain management: adequate  Airway patency: patent  Anesthetic complications: no  Cardiovascular status: acceptable  Respiratory status: acceptable  Hydration status: acceptable  Post anesthesia nausea and vomiting:  controlled      Vitals Value Taken Time   /70 10/16/2019 12:11 PM   Temp 36.3 °C (97.4 °F) 10/16/2019 12:02 PM   Pulse 57 10/16/2019 12:14 PM   Resp 16 10/16/2019 12:02 PM   SpO2 99 % 10/16/2019 12:14 PM   Vitals shown include unvalidated device data.

## 2019-10-16 NOTE — DISCHARGE INSTRUCTIONS
Post-Operative Instructions   For  Knee Arthroscopy  Phone:  [phone]    1. Unless otherwise instructed, you may place as much weight on your leg as you wish. 2. If you do not have an \"Iceman\" type cooling unit, for the first 48-72 hours following surgery, use ice on the knee every two hours (while awake) for 20-30 minutes at a time to help prevent swelling and lessen pain. If you have a cooling unit, follow the instructions given to you- continually as much as possible the first 48-72 hours, then 3-4 times a day for 4 weeks. Elevate leg. 3. Perform at least 30 to 50 leg-raising exercises twice a day. Begin exercise as soon as pain allows. Also perform gentle active motion of the knee as soon as pain allows. 4. On the third day after surgery, remove the dressing. Leave steri-strips on, they eventually will peel off.      5. Use any pain medication as instructed. You should take your pain medication as soon as you feel the anesthetic wearing off. Do not wait until you are in severe pain to begin taking your pain medication. 6. You may have some side effects from your pain medication. If you have nausea, try taking your medication with food. For itching, you may take over the counter Benadryl. 7. You may shower as soon as desired. The first three days after surgery, wrap the dressings in saran wrap to keep them dry when showering. 8. You may have been given a prescription for Zofran or Phenergan. This medication is used for nausea and vomiting. You do not need to get this prescription filled unless you have a problem. 9. If you have a problem, please call [address] [place] [address] at [phone]    [facility] [employer] ACTIVITY  · As tolerated and as directed by your doctor. · Bathe or shower as directed by your doctor. DIET  · Clear liquids until no nausea or vomiting; then light diet for the first day.   · Advance to regular diet on second day, unless your doctor orders otherwise. · If nausea and vomiting continues, call your doctor. PAIN  · Take pain medication as directed by your doctor. · Call your doctor if pain is NOT relieved by medication. · DO NOT take aspirin of blood thinners unless directed by your doctor. CALL YOUR DOCTOR IF   · Excessive bleeding that does not stop after holding pressure over the area  · Temperature of 101 degrees F or above  · Excessive redness, swelling or bruising, and/ or green or yellow, smelly discharge from incision        After general anesthesia or intravenous sedation, for 24 hours or while taking prescription Narcotics:  · Limit your activities  · Do not drive and operate hazardous machinery  · Do not make important personal or business decisions  · Do  not drink alcoholic beverages  · If you have not urinated within 8 hours after discharge, please contact your surgeon on call. *  Please give a list of your current medications to your Primary Care Provider. *  Please update this list whenever your medications are discontinued, doses are      changed, or new medications (including over-the-counter products) are added. *  Please carry medication information at all times in case of emergency situations. These are general instructions for a healthy lifestyle:    No smoking/ No tobacco products/ Avoid exposure to second hand smoke    Surgeon General's Warning:  Quitting smoking now greatly reduces serious risk to your health.     Obesity, smoking, and sedentary lifestyle greatly increases your risk for illness    A healthy diet, regular physical exercise & weight monitoring are important for maintaining a healthy lifestyle    You may be retaining fluid if you have a history of heart failure or if you experience any of the following symptoms:  Weight gain of 3 pounds or more overnight or 5 pounds in a week, increased swelling in our hands or feet or shortness of breath while lying flat in bed. Please call your doctor as soon as you notice any of these symptoms; do not wait until your next office visit. Recognize signs and symptoms of STROKE:    F-face looks uneven    A-arms unable to move or move unevenly    S-speech slurred or non-existent    T-time-call 911 as soon as signs and symptoms begin-DO NOT go       Back to bed or wait to see if you get better-TIME IS BRAIN.

## 2019-10-23 NOTE — BRIEF OP NOTE
BRIEF OPERATIVE NOTE    Date of Procedure: 10/16/2019   Preoperative Diagnosis: Sprain of anterior cruciate ligament of right knee, initial encounter [S83.511A]  Unilateral primary osteoarthritis, right knee [M17.11]  Postoperative Diagnosis: Medial meniscus tear of right knee  Unilateral primary osteoarthritis, right knee [M17.11]    Procedure(s):  RIGHT KNEE ARTHROSCOPY  MEDIAL MENISCECTOMY  Surgeon(s) and Role:     * Holley Duran MD - Primary         Surgical Assistant:     Surgical Staff:  Circ-1: Keon Genao RN  Physician Assistant: CRISTIAN Trujillo  Scrub Tech-1: Dallas Alvarado  Scrub Tech-Relief: Hao Casey  Event Time In Time Out   Incision Start 1131    Incision Close 1149      Anesthesia: General   Estimated Blood Loss: min  Specimens: * No specimens in log *   Findings: mm   Complications: none  Implants: * No implants in log *

## 2021-08-03 ENCOUNTER — HOSPITAL ENCOUNTER (OUTPATIENT)
Dept: PHYSICAL THERAPY | Age: 46
Discharge: HOME | End: 2021-08-03

## 2021-08-03 NOTE — PROGRESS NOTES
Ciarra Bacon  : 1975  Primary: [address] [facility]*  Secondary:  [address] at [place]  [address] [facility], [address] [place] [address],[address], [address] [facility]  [phone]   [phone]          Phone: [phone]   Fax: [phone]    Pt cancelled today's physical therapy evaluation appointment reporting pelvic issues.       Alexandre Thomas, MPT

## 2021-08-16 ENCOUNTER — HOSPITAL ENCOUNTER (OUTPATIENT)
Dept: PHYSICAL THERAPY | Age: 46
Discharge: HOME | End: 2021-08-16

## 2021-08-16 NOTE — PROGRESS NOTES
Iliana Oliveira  : 1975  Primary: [address] [facility]*  Secondary:  [address] at [facility]  [address] [facility], [address] [place] [address] 83[address], [facility] [address]  [phone]   [phone]       OUTPATIENT DAILY NOTE    NAME/AGE/GENDER: Iliana Oliveira is a 50 y.o. female. DATE: 2021    Ms. 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     Joselo Pace, PT

## 2024-04-15 ENCOUNTER — OFFICE VISIT (OUTPATIENT)
Dept: NEUROSURGERY | Age: 49
End: 2024-04-15
Payer: COMMERCIAL

## 2024-04-15 VITALS
BODY MASS INDEX: 27.75 KG/M2 | OXYGEN SATURATION: 96 % | SYSTOLIC BLOOD PRESSURE: 146 MMHG | TEMPERATURE: 97.9 F | WEIGHT: 176.8 LBS | HEIGHT: 67 IN | HEART RATE: 106 BPM | DIASTOLIC BLOOD PRESSURE: 99 MMHG

## 2024-04-15 DIAGNOSIS — Z98.1 HISTORY OF LUMBAR FUSION: ICD-10-CM

## 2024-04-15 DIAGNOSIS — M51.26 LUMBAR DISC HERNIATION: ICD-10-CM

## 2024-04-15 DIAGNOSIS — M54.16 LUMBAR RADICULOPATHY: Primary | ICD-10-CM

## 2024-04-15 PROCEDURE — 99204 OFFICE O/P NEW MOD 45 MIN: CPT | Performed by: NEUROLOGICAL SURGERY

## 2024-04-15 RX ORDER — AMOXICILLIN 500 MG/1
TABLET, FILM COATED ORAL
COMMUNITY
Start: 2024-03-21

## 2024-04-15 RX ORDER — CYCLOBENZAPRINE HCL 10 MG
10 TABLET ORAL 3 TIMES DAILY PRN
Qty: 30 TABLET | Refills: 0 | Status: SHIPPED | OUTPATIENT
Start: 2024-04-15 | End: 2024-04-25

## 2024-04-15 RX ORDER — ZOLPIDEM TARTRATE 5 MG/1
5 TABLET ORAL NIGHTLY
COMMUNITY
Start: 2023-03-06

## 2024-04-15 RX ORDER — METHYLPREDNISOLONE 4 MG/1
TABLET ORAL
COMMUNITY
Start: 2024-04-11

## 2024-04-15 RX ORDER — HYDROCODONE BITARTRATE AND ACETAMINOPHEN 5; 325 MG/1; MG/1
2 TABLET ORAL EVERY 6 HOURS PRN
Qty: 40 TABLET | Refills: 0 | Status: SHIPPED | OUTPATIENT
Start: 2024-04-15 | End: 2024-04-20

## 2024-04-15 RX ORDER — PREDNISONE 20 MG/1
20 TABLET ORAL DAILY
COMMUNITY

## 2024-04-15 RX ORDER — ROPINIROLE 1 MG/1
TABLET, FILM COATED ORAL
COMMUNITY
Start: 2024-03-19

## 2024-04-15 RX ORDER — OMEPRAZOLE 40 MG/1
40 CAPSULE, DELAYED RELEASE ORAL DAILY
COMMUNITY

## 2024-04-15 RX ORDER — CLONAZEPAM 1 MG/1
TABLET ORAL
COMMUNITY

## 2024-04-15 RX ORDER — CYCLOBENZAPRINE HCL 10 MG
10 TABLET ORAL 3 TIMES DAILY
COMMUNITY
Start: 2019-12-05

## 2024-04-15 RX ORDER — CELECOXIB 200 MG/1
200 CAPSULE ORAL 2 TIMES DAILY
COMMUNITY
Start: 2022-11-21

## 2024-04-15 RX ORDER — TRAMADOL HYDROCHLORIDE 50 MG/1
TABLET ORAL
COMMUNITY
End: 2024-04-15

## 2024-04-15 RX ORDER — NALOXONE HYDROCHLORIDE 4 MG/.1ML
1 SPRAY NASAL PRN
Qty: 1 EACH | Refills: 0 | Status: SHIPPED | OUTPATIENT
Start: 2024-04-15

## 2024-04-15 NOTE — H&P (VIEW-ONLY)
total- last seizure 2/2018-- believer r/t a medication     Past Surgical History:   Procedure Laterality Date    ANTERIOR CRUCIATE LIGAMENT REPAIR Right 2014    right knee    ANTERIOR CRUCIATE LIGAMENT REPAIR  2009    RIGHT    APPENDECTOMY  2002    CHOLECYSTECTOMY  1999    GASTRECTOMY      gastric sleeve    KNEE ARTHROSCOPY  10/12    L knee scope    KNEE ARTHROSCP HARV  x 2 1990, 1993    RIGHT    LUMBAR FUSION  2006    L5, S1 discectomy/fusion with a frontal approach    ORTHOPEDIC SURGERY Left 5/2011    left foot surg    OTHER SURGICAL HISTORY  2004    left hand I&D    OTHER SURGICAL HISTORY  5/2013    EUA- sphincterectomy    TLH AND BSO (CERVIX REMOVED)  2/2013    hemmorhoidectomy    TOTAL COLECTOMY  09/2017    subtotal colectomy for idiopathic constipation     Allergies   Allergen Reactions    Ketorolac Hives     Migraine, dizziness  Injections only    Clomiphene Nausea And Vomiting     severe      Family History   Problem Relation Age of Onset    Colon Cancer Neg Hx     Other Neg Hx     Post-op Cognitive Dysfunction Neg Hx     Emergence Delirium Neg Hx     Post-op Nausea/Vomiting Neg Hx     Delayed Awakening Neg Hx     Breast Cancer Mother 57    Malig Hypertherm Neg Hx     Ovarian Cancer Other 45        Maternal great aunt    Breast Cancer Maternal Grandmother 61    No Known Problems Sister     No Known Problems Father     Pseudochol. Deficiency Neg Hx       Social History     Socioeconomic History    Marital status:      Spouse name: Not on file    Number of children: Not on file    Years of education: Not on file    Highest education level: Not on file   Occupational History    Not on file   Tobacco Use    Smoking status: Never    Smokeless tobacco: Never   Substance and Sexual Activity    Alcohol use: Yes    Drug use: No    Sexual activity: Not on file   Other Topics Concern    Not on file   Social History Narrative    Not on file     Social Determinants of Health     Financial Resource Strain: Not on

## 2024-04-15 NOTE — PROGRESS NOTES
left eccentric disc herniation that contacts and compresses the transiting L4 nerve root on the left.  At this time the patient has failed conservative measures for greater than 6 weeks time.  She has some subtle left quad weakness as well as diminished reflexes in the left patellar response.  I recommend proceeding with a minimally invasive left-sided L3-L4 minimally invasive hemilaminectomy, medial facetectomy and discectomy.  I discussed although she has a slight disc protrusion and degenerative disc disease at L4-L5 adjacent to a prior L5-S1 fusion that I do not see any significant compression that requires surgical intervention at this level at that time. Lumbar Diskectomy Consent: The relative risks of complication include but are not limited to infection, bleeding, spinal fluid leak, major vascular injury, increased pain, weakness numbness, delayed spinal instability, paralysis, incontinence, impotence, heart attack, stroke and death were discussed with patient and family members present. They have been provided the opportunity to ask any questions regarding the surgical procedure. The patient and family members present expressed understanding and agree to proceed with surgery.  In the short-term I will prescribe her a short course of Norco and muscle relaxant to help her with pain relief while she is waiting to see another pain management specialist.      ICD-10-CM    1. Lumbar radiculopathy  M54.16       2. Lumbar disc herniation  M51.26       3. History of lumbar fusion  Z98.1         Gallo Max MD, FAANS  Neurosurgeon  [facility]  [facility]     Notes are transcribed with [employer], a medical voice recording dictation service, and may contain minor errors.

## 2024-04-22 ENCOUNTER — PREP FOR PROCEDURE (OUTPATIENT)
Dept: NEUROSURGERY | Age: 49
End: 2024-04-22

## 2024-04-22 DIAGNOSIS — Z98.1 HISTORY OF LUMBAR FUSION: ICD-10-CM

## 2024-04-22 DIAGNOSIS — M51.26 LUMBAR DISC HERNIATION: ICD-10-CM

## 2024-04-22 DIAGNOSIS — M54.16 LUMBAR RADICULOPATHY: ICD-10-CM

## 2024-04-24 ENCOUNTER — HOSPITAL ENCOUNTER (OUTPATIENT)
Dept: SURGERY | Age: 49
Discharge: HOME | End: 2024-04-27
Payer: MEDICARE

## 2024-04-24 VITALS
DIASTOLIC BLOOD PRESSURE: 85 MMHG | WEIGHT: 169.5 LBS | HEART RATE: 98 BPM | SYSTOLIC BLOOD PRESSURE: 119 MMHG | RESPIRATION RATE: 16 BRPM | BODY MASS INDEX: 26.6 KG/M2 | TEMPERATURE: 97.7 F | OXYGEN SATURATION: 97 % | HEIGHT: 67 IN

## 2024-04-24 PROCEDURE — 87641 MR-STAPH DNA AMP PROBE: CPT

## 2024-04-24 RX ORDER — ESTRADIOL 1 MG/1
1 TABLET ORAL DAILY
COMMUNITY

## 2024-04-24 RX ORDER — ZOLPIDEM TARTRATE 12.5 MG/1
12.5 TABLET, FILM COATED, EXTENDED RELEASE ORAL
COMMUNITY

## 2024-04-24 RX ORDER — MULTIVIT-MIN/FA/LYCOPEN/LUTEIN .4-300-25
1 TABLET ORAL DAILY
COMMUNITY

## 2024-04-24 RX ORDER — AMOXICILLIN AND CLAVULANATE POTASSIUM 875; 125 MG/1; MG/1
1 TABLET, FILM COATED ORAL 2 TIMES DAILY
COMMUNITY

## 2024-04-24 RX ORDER — ALPRAZOLAM 1 MG/1
1 TABLET ORAL 2 TIMES DAILY PRN
COMMUNITY

## 2024-04-24 ASSESSMENT — PAIN DESCRIPTION - DESCRIPTORS: DESCRIPTORS: THROBBING

## 2024-04-24 ASSESSMENT — PAIN DESCRIPTION - LOCATION: LOCATION: BACK

## 2024-04-24 ASSESSMENT — PAIN DESCRIPTION - ORIENTATION: ORIENTATION: LOWER

## 2024-04-24 ASSESSMENT — PAIN SCALES - GENERAL: PAINLEVEL_OUTOF10: 6

## 2024-04-24 NOTE — PERIOP NOTE
PLEASE CONTINUE TAKING ALL PRESCRIPTION MEDICATIONS UP TO THE DAY OF SURGERY UNLESS OTHERWISE DIRECTED BELOW. You may take Tylenol, allergy,  and/or indigestion medications.     TAKE ONLY THESE MEDICATIONS ON THE DAY OF SURGERY      Alprazolam (Xanax)- takes as instructed if needed.     Estradiol (Estrace)   Omeprazole (Prilosec)  Augmentin      DISCONTINUE all vitamins and supplements 7 days prior to surgery. DISCONTINUE Non-Steroidal Anti-Inflammatory (NSAIDS) such as Advil, Ibuprofen, Excedrin, Motrin, and Aleve 5 days prior to surgery.     Home Medications to Hold- please continue all other medications except these.      Centrum Silver Vitamin     Nutrafol     Comments     CARMELLA-HEX shower the night before surgery and the morning of surgery.   On the day before surgery please take 2 Tylenol in the morning and then again before bed. You may use either regular or extra strength.           Please do not bring home medications with you on the day of surgery unless otherwise directed by your nurse.  If you are instructed to bring home medications, please give them to your nurse as they will be administered by the nursing staff.    If you have any questions, please call [facility] [place] [phone].    A copy of this note was provided to the patient for reference.

## 2024-04-24 NOTE — PERIOP NOTE
Patient verified name, , and surgery as listed in [facility]. Patient provided medical/health information and PTA medications to the best of their ability.    TYPE  CASE: 1B  Orders per surgeon: Orders not received  Labs per surgeon: MRSA/MSSA. Results: pending.  Labs per anesthesia protocol: Hgb- 14.6 completed on 24 in care everywhere  EKG:  NA    Pt. States she was seen in Urgent care on 24 for sinus symptoms. Pt tested negative for COVID, Flu, and Strep. Pt discharged home with antibiotics. Reviewed with Dr Yang duarte to proceed with scheduled surgery giving surgery is on 24. Dr Ontiveros notified pt instructed to contact her surgeons office if symptoms appear to be getting worse or pt develops further complication or symptoms.     MRSA/MSSA swab collected; pharmacy to review and dose antibiotic as appropriate.     Patient provided with and instructed on education handouts including Guide to Surgery, blood transfusions, pain management, and hand hygiene for the family and community, and [facility] brochure.     Road to Recovery Spine surgery patient guide given.     Hibiclens and instructions given per hospital policy.    Original medication prescription bottles were not visualized during patient appointment.     Patient teach back successful and patient demonstrates knowledge of instruction.

## 2024-04-25 LAB
MRSA DNA SPEC QL NAA+PROBE: NOT DETECTED
S AUREUS CPE NOSE QL NAA+PROBE: DETECTED

## 2024-05-01 ENCOUNTER — TELEPHONE (OUTPATIENT)
Dept: NEUROSURGERY | Age: 49
End: 2024-05-01

## 2024-05-01 DIAGNOSIS — M54.16 LUMBAR RADICULOPATHY: Primary | ICD-10-CM

## 2024-05-01 RX ORDER — HYDROCODONE BITARTRATE AND ACETAMINOPHEN 5; 325 MG/1; MG/1
1 TABLET ORAL EVERY 4 HOURS PRN
Qty: 30 TABLET | Refills: 0 | Status: SHIPPED | OUTPATIENT
Start: 2024-05-01 | End: 2024-05-06

## 2024-05-01 NOTE — TELEPHONE ENCOUNTER
Vm left at 1:57 about her questions regarding anemia and will place a req for a Norco 5 preop refill        Patient queried at 1:57 on [facility] to confirm medication history

## 2024-05-01 NOTE — TELEPHONE ENCOUNTER
Pt calling regarding her anemia and iron levels. Pt is scheduled for surgery 5.8.2024.    Pt would like to know if she can have another refill for her hydrocodone 5mg. - [facility] [place]

## 2024-05-02 ENCOUNTER — TELEPHONE (OUTPATIENT)
Dept: NEUROSURGERY | Age: 49
End: 2024-05-02

## 2024-05-02 NOTE — TELEPHONE ENCOUNTER
Patient called to inform Dr. Max of her iron deficiency anemia, transfusion on 2/13, and last HGB on 4-22 14.6. Advised her surgery will have minimal blood loss, but she is wondering if an iron level needs to be drawn preop?        Hx of hip replacement and pt was wondering if she needed to be premedicated with Amoxicillin- advised her she will receive IV pre op antibiotics    Oxycodone sensitivity documented

## 2024-05-07 ENCOUNTER — ANESTHESIA EVENT (OUTPATIENT)
Dept: SURGERY | Age: 49
End: 2024-05-07
Payer: MEDICARE

## 2024-05-08 ENCOUNTER — APPOINTMENT (OUTPATIENT)
Dept: GENERAL RADIOLOGY | Age: 49
End: 2024-05-08
Attending: NEUROLOGICAL SURGERY
Payer: MEDICARE

## 2024-05-08 ENCOUNTER — ANESTHESIA (OUTPATIENT)
Dept: SURGERY | Age: 49
End: 2024-05-08
Payer: MEDICARE

## 2024-05-08 ENCOUNTER — HOSPITAL ENCOUNTER (OUTPATIENT)
Age: 49
Setting detail: OUTPATIENT SURGERY
Discharge: HOME | End: 2024-05-08
Attending: NEUROLOGICAL SURGERY | Admitting: NEUROLOGICAL SURGERY
Payer: MEDICARE

## 2024-05-08 VITALS
SYSTOLIC BLOOD PRESSURE: 152 MMHG | BODY MASS INDEX: 27.25 KG/M2 | TEMPERATURE: 98 F | RESPIRATION RATE: 15 BRPM | WEIGHT: 174 LBS | DIASTOLIC BLOOD PRESSURE: 94 MMHG | OXYGEN SATURATION: 97 % | HEART RATE: 87 BPM

## 2024-05-08 DIAGNOSIS — M51.26 LUMBAR DISC HERNIATION: Primary | ICD-10-CM

## 2024-05-08 DIAGNOSIS — M54.16 LUMBAR RADICULOPATHY: ICD-10-CM

## 2024-05-08 PROCEDURE — 63030 LAMOT DCMPRN NRV RT 1 LMBR: CPT | Performed by: NEUROLOGICAL SURGERY

## 2024-05-08 PROCEDURE — 2500000003 HC RX 250 WO HCPCS: Performed by: NEUROLOGICAL SURGERY

## 2024-05-08 PROCEDURE — 2709999900 HC NON-CHARGEABLE SUPPLY: Performed by: NEUROLOGICAL SURGERY

## 2024-05-08 PROCEDURE — 6360000002 HC RX W HCPCS: Performed by: NEUROLOGICAL SURGERY

## 2024-05-08 PROCEDURE — 2580000003 HC RX 258: Performed by: NEUROLOGICAL SURGERY

## 2024-05-08 PROCEDURE — A4217 STERILE WATER/SALINE, 500 ML: HCPCS | Performed by: NEUROLOGICAL SURGERY

## 2024-05-08 PROCEDURE — 3700000001 HC ADD 15 MINUTES (ANESTHESIA): Performed by: NEUROLOGICAL SURGERY

## 2024-05-08 PROCEDURE — 2500000003 HC RX 250 WO HCPCS

## 2024-05-08 PROCEDURE — 3700000000 HC ANESTHESIA ATTENDED CARE: Performed by: NEUROLOGICAL SURGERY

## 2024-05-08 PROCEDURE — 7100000000 HC PACU RECOVERY - FIRST 15 MIN: Performed by: NEUROLOGICAL SURGERY

## 2024-05-08 PROCEDURE — 2580000003 HC RX 258: Performed by: STUDENT IN AN ORGANIZED HEALTH CARE EDUCATION/TRAINING PROGRAM

## 2024-05-08 PROCEDURE — 6360000002 HC RX W HCPCS: Performed by: STUDENT IN AN ORGANIZED HEALTH CARE EDUCATION/TRAINING PROGRAM

## 2024-05-08 PROCEDURE — 2720000010 HC SURG SUPPLY STERILE: Performed by: NEUROLOGICAL SURGERY

## 2024-05-08 PROCEDURE — 3600000014 HC SURGERY LEVEL 4 ADDTL 15MIN: Performed by: NEUROLOGICAL SURGERY

## 2024-05-08 PROCEDURE — 6360000002 HC RX W HCPCS

## 2024-05-08 PROCEDURE — 7100000011 HC PHASE II RECOVERY - ADDTL 15 MIN: Performed by: NEUROLOGICAL SURGERY

## 2024-05-08 PROCEDURE — 7100000001 HC PACU RECOVERY - ADDTL 15 MIN: Performed by: NEUROLOGICAL SURGERY

## 2024-05-08 PROCEDURE — 6370000000 HC RX 637 (ALT 250 FOR IP): Performed by: NEUROLOGICAL SURGERY

## 2024-05-08 PROCEDURE — 3600000004 HC SURGERY LEVEL 4 BASE: Performed by: NEUROLOGICAL SURGERY

## 2024-05-08 PROCEDURE — 7100000010 HC PHASE II RECOVERY - FIRST 15 MIN: Performed by: NEUROLOGICAL SURGERY

## 2024-05-08 RX ORDER — KETAMINE HYDROCHLORIDE 50 MG/ML
INJECTION, SOLUTION INTRAMUSCULAR; INTRAVENOUS PRN
Status: DISCONTINUED | OUTPATIENT
Start: 2024-05-08 | End: 2024-05-08 | Stop reason: SDUPTHER

## 2024-05-08 RX ORDER — METHYLPREDNISOLONE ACETATE 40 MG/ML
INJECTION, SUSPENSION INTRA-ARTICULAR; INTRALESIONAL; INTRAMUSCULAR; SOFT TISSUE PRN
Status: DISCONTINUED | OUTPATIENT
Start: 2024-05-08 | End: 2024-05-08 | Stop reason: ALTCHOICE

## 2024-05-08 RX ORDER — HYDROMORPHONE HYDROCHLORIDE 2 MG/ML
0.5 INJECTION, SOLUTION INTRAMUSCULAR; INTRAVENOUS; SUBCUTANEOUS EVERY 5 MIN PRN
Status: DISCONTINUED | OUTPATIENT
Start: 2024-05-08 | End: 2024-05-08 | Stop reason: HOSPADM

## 2024-05-08 RX ORDER — FENTANYL CITRATE 50 UG/ML
INJECTION, SOLUTION INTRAMUSCULAR; INTRAVENOUS PRN
Status: DISCONTINUED | OUTPATIENT
Start: 2024-05-08 | End: 2024-05-08 | Stop reason: SDUPTHER

## 2024-05-08 RX ORDER — PROCHLORPERAZINE EDISYLATE 5 MG/ML
5 INJECTION INTRAMUSCULAR; INTRAVENOUS
Status: DISCONTINUED | OUTPATIENT
Start: 2024-05-08 | End: 2024-05-08 | Stop reason: HOSPADM

## 2024-05-08 RX ORDER — ONDANSETRON 2 MG/ML
INJECTION INTRAMUSCULAR; INTRAVENOUS PRN
Status: DISCONTINUED | OUTPATIENT
Start: 2024-05-08 | End: 2024-05-08 | Stop reason: SDUPTHER

## 2024-05-08 RX ORDER — ROCURONIUM BROMIDE 10 MG/ML
INJECTION, SOLUTION INTRAVENOUS PRN
Status: DISCONTINUED | OUTPATIENT
Start: 2024-05-08 | End: 2024-05-08 | Stop reason: SDUPTHER

## 2024-05-08 RX ORDER — VANCOMYCIN HYDROCHLORIDE 1 G/20ML
INJECTION, POWDER, LYOPHILIZED, FOR SOLUTION INTRAVENOUS PRN
Status: DISCONTINUED | OUTPATIENT
Start: 2024-05-08 | End: 2024-05-08 | Stop reason: ALTCHOICE

## 2024-05-08 RX ORDER — SODIUM CHLORIDE 0.9 % (FLUSH) 0.9 %
5-40 SYRINGE (ML) INJECTION EVERY 12 HOURS SCHEDULED
Status: DISCONTINUED | OUTPATIENT
Start: 2024-05-08 | End: 2024-05-08 | Stop reason: HOSPADM

## 2024-05-08 RX ORDER — FENTANYL CITRATE 50 UG/ML
100 INJECTION, SOLUTION INTRAMUSCULAR; INTRAVENOUS
Status: DISCONTINUED | OUTPATIENT
Start: 2024-05-08 | End: 2024-05-08 | Stop reason: HOSPADM

## 2024-05-08 RX ORDER — ACETAMINOPHEN 500 MG
1000 TABLET ORAL ONCE
Status: COMPLETED | OUTPATIENT
Start: 2024-05-08 | End: 2024-05-08

## 2024-05-08 RX ORDER — NALOXONE HYDROCHLORIDE 0.4 MG/ML
INJECTION, SOLUTION INTRAMUSCULAR; INTRAVENOUS; SUBCUTANEOUS PRN
Status: DISCONTINUED | OUTPATIENT
Start: 2024-05-08 | End: 2024-05-08 | Stop reason: HOSPADM

## 2024-05-08 RX ORDER — BUPIVACAINE HYDROCHLORIDE AND EPINEPHRINE 5; 5 MG/ML; UG/ML
INJECTION, SOLUTION EPIDURAL; INTRACAUDAL; PERINEURAL PRN
Status: DISCONTINUED | OUTPATIENT
Start: 2024-05-08 | End: 2024-05-08 | Stop reason: ALTCHOICE

## 2024-05-08 RX ORDER — SODIUM CHLORIDE 9 MG/ML
INJECTION, SOLUTION INTRAVENOUS PRN
Status: DISCONTINUED | OUTPATIENT
Start: 2024-05-08 | End: 2024-05-08 | Stop reason: HOSPADM

## 2024-05-08 RX ORDER — PREGABALIN 75 MG/1
75 CAPSULE ORAL ONCE
Status: DISCONTINUED | OUTPATIENT
Start: 2024-05-08 | End: 2024-05-08 | Stop reason: HOSPADM

## 2024-05-08 RX ORDER — DEXAMETHASONE SODIUM PHOSPHATE 10 MG/ML
INJECTION INTRAMUSCULAR; INTRAVENOUS PRN
Status: DISCONTINUED | OUTPATIENT
Start: 2024-05-08 | End: 2024-05-08 | Stop reason: SDUPTHER

## 2024-05-08 RX ORDER — PROPOFOL 10 MG/ML
INJECTION, EMULSION INTRAVENOUS PRN
Status: DISCONTINUED | OUTPATIENT
Start: 2024-05-08 | End: 2024-05-08 | Stop reason: SDUPTHER

## 2024-05-08 RX ORDER — SODIUM CHLORIDE 0.9 % (FLUSH) 0.9 %
5-40 SYRINGE (ML) INJECTION PRN
Status: DISCONTINUED | OUTPATIENT
Start: 2024-05-08 | End: 2024-05-08 | Stop reason: HOSPADM

## 2024-05-08 RX ORDER — ONDANSETRON 2 MG/ML
4 INJECTION INTRAMUSCULAR; INTRAVENOUS
Status: DISCONTINUED | OUTPATIENT
Start: 2024-05-08 | End: 2024-05-08 | Stop reason: HOSPADM

## 2024-05-08 RX ORDER — GLYCOPYRROLATE 0.2 MG/ML
INJECTION INTRAMUSCULAR; INTRAVENOUS PRN
Status: DISCONTINUED | OUTPATIENT
Start: 2024-05-08 | End: 2024-05-08 | Stop reason: SDUPTHER

## 2024-05-08 RX ORDER — SODIUM CHLORIDE, SODIUM LACTATE, POTASSIUM CHLORIDE, CALCIUM CHLORIDE 600; 310; 30; 20 MG/100ML; MG/100ML; MG/100ML; MG/100ML
INJECTION, SOLUTION INTRAVENOUS CONTINUOUS
Status: DISCONTINUED | OUTPATIENT
Start: 2024-05-08 | End: 2024-05-08 | Stop reason: HOSPADM

## 2024-05-08 RX ORDER — LIDOCAINE HYDROCHLORIDE 20 MG/ML
INJECTION, SOLUTION EPIDURAL; INFILTRATION; INTRACAUDAL; PERINEURAL PRN
Status: DISCONTINUED | OUTPATIENT
Start: 2024-05-08 | End: 2024-05-08 | Stop reason: SDUPTHER

## 2024-05-08 RX ORDER — LIDOCAINE HYDROCHLORIDE 10 MG/ML
1 INJECTION, SOLUTION INFILTRATION; PERINEURAL
Status: DISCONTINUED | OUTPATIENT
Start: 2024-05-08 | End: 2024-05-08 | Stop reason: HOSPADM

## 2024-05-08 RX ORDER — MIDAZOLAM HYDROCHLORIDE 2 MG/2ML
2 INJECTION, SOLUTION INTRAMUSCULAR; INTRAVENOUS
Status: COMPLETED | OUTPATIENT
Start: 2024-05-08 | End: 2024-05-08

## 2024-05-08 RX ORDER — ONDANSETRON 4 MG/1
4 TABLET, ORALLY DISINTEGRATING ORAL 3 TIMES DAILY PRN
Qty: 21 TABLET | Refills: 0 | Status: SHIPPED | OUTPATIENT
Start: 2024-05-08

## 2024-05-08 RX ORDER — HYDROCODONE BITARTRATE AND ACETAMINOPHEN 10; 325 MG/1; MG/1
1 TABLET ORAL EVERY 6 HOURS PRN
Qty: 20 TABLET | Refills: 0 | Status: SHIPPED | OUTPATIENT
Start: 2024-05-08 | End: 2024-05-13

## 2024-05-08 RX ORDER — NEOSTIGMINE METHYLSULFATE 1 MG/ML
INJECTION, SOLUTION INTRAVENOUS PRN
Status: DISCONTINUED | OUTPATIENT
Start: 2024-05-08 | End: 2024-05-08 | Stop reason: SDUPTHER

## 2024-05-08 RX ADMIN — ROCURONIUM BROMIDE 10 MG: 50 INJECTION, SOLUTION INTRAVENOUS at 13:03

## 2024-05-08 RX ADMIN — KETAMINE HYDROCHLORIDE 20 MG: 50 INJECTION, SOLUTION INTRAMUSCULAR; INTRAVENOUS at 12:02

## 2024-05-08 RX ADMIN — ROCURONIUM BROMIDE 50 MG: 50 INJECTION, SOLUTION INTRAVENOUS at 12:02

## 2024-05-08 RX ADMIN — PROPOFOL 200 MG: 10 INJECTION, EMULSION INTRAVENOUS at 12:02

## 2024-05-08 RX ADMIN — Medication 3 AMPULE: at 08:47

## 2024-05-08 RX ADMIN — PHENYLEPHRINE HYDROCHLORIDE 50 MCG: 0.1 INJECTION, SOLUTION INTRAVENOUS at 12:47

## 2024-05-08 RX ADMIN — FENTANYL CITRATE 100 MCG: 50 INJECTION, SOLUTION INTRAMUSCULAR; INTRAVENOUS at 12:02

## 2024-05-08 RX ADMIN — HYDROMORPHONE HYDROCHLORIDE 0.5 MG: 2 INJECTION INTRAMUSCULAR; INTRAVENOUS; SUBCUTANEOUS at 14:29

## 2024-05-08 RX ADMIN — LIDOCAINE HYDROCHLORIDE 100 MG: 20 INJECTION, SOLUTION EPIDURAL; INFILTRATION; INTRACAUDAL; PERINEURAL at 12:02

## 2024-05-08 RX ADMIN — PHENYLEPHRINE HYDROCHLORIDE 100 MCG: 0.1 INJECTION, SOLUTION INTRAVENOUS at 12:34

## 2024-05-08 RX ADMIN — Medication 2000 MG: at 12:18

## 2024-05-08 RX ADMIN — DEXAMETHASONE SODIUM PHOSPHATE 10 MG: 10 INJECTION INTRAMUSCULAR; INTRAVENOUS at 12:15

## 2024-05-08 RX ADMIN — PHENYLEPHRINE HYDROCHLORIDE 100 MCG: 0.1 INJECTION, SOLUTION INTRAVENOUS at 12:51

## 2024-05-08 RX ADMIN — PHENYLEPHRINE HYDROCHLORIDE 100 MCG: 0.1 INJECTION, SOLUTION INTRAVENOUS at 13:17

## 2024-05-08 RX ADMIN — SODIUM CHLORIDE, POTASSIUM CHLORIDE, SODIUM LACTATE AND CALCIUM CHLORIDE: 600; 310; 30; 20 INJECTION, SOLUTION INTRAVENOUS at 08:51

## 2024-05-08 RX ADMIN — Medication 3 MG: at 13:33

## 2024-05-08 RX ADMIN — ONDANSETRON 4 MG: 2 INJECTION INTRAMUSCULAR; INTRAVENOUS at 12:24

## 2024-05-08 RX ADMIN — VANCOMYCIN HYDROCHLORIDE 1000 MG: 1 INJECTION, POWDER, LYOPHILIZED, FOR SOLUTION INTRAVENOUS at 11:53

## 2024-05-08 RX ADMIN — PHENYLEPHRINE HYDROCHLORIDE 100 MCG: 0.1 INJECTION, SOLUTION INTRAVENOUS at 13:26

## 2024-05-08 RX ADMIN — HYDROMORPHONE HYDROCHLORIDE 0.5 MG: 2 INJECTION INTRAMUSCULAR; INTRAVENOUS; SUBCUTANEOUS at 14:15

## 2024-05-08 RX ADMIN — MIDAZOLAM 2 MG: 1 INJECTION INTRAMUSCULAR; INTRAVENOUS at 11:54

## 2024-05-08 RX ADMIN — GLYCOPYRROLATE 0.4 MG: 0.2 INJECTION INTRAMUSCULAR; INTRAVENOUS at 13:33

## 2024-05-08 RX ADMIN — ACETAMINOPHEN 1000 MG: 500 TABLET, FILM COATED ORAL at 11:54

## 2024-05-08 ASSESSMENT — PAIN DESCRIPTION - LOCATION
LOCATION: BACK
LOCATION: BACK

## 2024-05-08 ASSESSMENT — PAIN - FUNCTIONAL ASSESSMENT: PAIN_FUNCTIONAL_ASSESSMENT: 0-10

## 2024-05-08 ASSESSMENT — PAIN SCALES - GENERAL
PAINLEVEL_OUTOF10: 8
PAINLEVEL_OUTOF10: 7

## 2024-05-08 NOTE — INTERVAL H&P NOTE
Update History & Physical    The patient's History and Physical of April 15, 2024 was reviewed with the patient and I examined the patient. There was no change. The surgical site was confirmed by the patient and me.     Plan: The risks, benefits, expected outcome, and alternative to the recommended procedure have been discussed with the patient. Patient understands and wants to proceed with the procedure.     Electronically signed by Gallo Max MD on 5/8/2024 at 11:11 AM

## 2024-05-08 NOTE — DISCHARGE INSTRUCTIONS
consider other forms of contraception for 1 month following your procedure if you are currently using oral contraceptives as your primary form of birth control. In addition to this, we recommend continuing your oral contraceptive as prescribed, unless otherwise instructed by your physician, during this time.    Deep Breathing Exercises: Continue to do your incentive spirometry and/or deep breathing exercises to prevent risk of pneumonia.    Smoking: YOU MAY NOT SMOKE!  Smoking will interfere with your healing.  If you smoke, you may end up with having another surgery or more problems!    Activity: No heavy lifting for 4 weeks after surgery.  This means anything heavier than a coffee cup or newspaper.  After 4 weeks, you may gradually begin lifting heavier things.  Avoid bending, stooping, or twisting at the waist.  Do not lie on your stomach to sleep.      You may remove your Reza hose when consistently walking.  You may do steps and inclines in moderation.    Sexual Relations: You may resume sexual relations 2 weeks after your surgery.    Walking Program: You should begin walking every day on the first day after surgery.  Start for short distances, then go a little farther each day.  You should eventually walk 1-2 miles every day for the long term.  This is very important in your recovery period because walking strengthens the spinal muscles and will help protect your disc and vertebrae.      Symptoms after Surgery: Don’t be alarmed if you still have some symptoms after surgery.  The nerves often require time to heal after the pressure has been taken off.  Be patient, you should see improvement with time.    Follow-up: You will need to call our office for an appointment to see a nurse one week after surgery for a wound check.  An appointment will be made then for you to see your surgeon about 4 weeks after surgery.    Please call our office if you have any other questions or problems.  Listen to your body; it will

## 2024-05-08 NOTE — ANESTHESIA PRE PROCEDURE
Department of Anesthesiology  Preprocedure Note       Name:  Nidia Thomas   Age:  48 y.o.  :  1975                                          MRN:  [id number]         Date:  2024      Surgeon: Surgeon(s):  Gallo Max MD    Procedure: Procedure(s):  left-sided L3-L4 minimally invasive hemilaminectomy, medial facetectomy and discectomy    Medications prior to admission:   Prior to Admission medications    Medication Sig Start Date End Date Taking? Authorizing Provider   amoxicillin-clavulanate (AUGMENTIN) 875-125 MG per tablet Take 1 tablet by mouth 2 times daily    Elmo Hurst MD   ALPRAZolam (XANAX) 1 MG tablet Take 1 tablet by mouth 2 times daily as needed for Sleep.    Elmo Hurst MD   estradiol (ESTRACE) 1 MG tablet Take 1 tablet by mouth daily    Elmo Hurst MD   zolpidem (AMBIEN CR) 12.5 MG extended release tablet Take 1 tablet by mouth nightly.    Elmo Hurst MD   Multiple Vitamins-Minerals (CENTRUM SILVER ADULT 50+) TABS Take 1 tablet by mouth daily    Elmo Hurst MD   NONFORMULARY Take 4 tablets by mouth daily Nutrafol    Elmo Hurst MD   rOPINIRole (REQUIP) 1 MG tablet Take 1 tablet by mouth nightly 3/19/24   Elmo Hurst MD   omeprazole (PRILOSEC) 40 MG delayed release capsule Take 1 capsule by mouth daily    Elmo Hurst MD   cyclobenzaprine (FLEXERIL) 10 MG tablet Take 1 tablet by mouth 3 times daily as needed 19   Elmo Hurst MD   celecoxib (CELEBREX) 200 MG capsule Take 1 capsule by mouth 2 times daily 22   Elmo Hurst MD   amoxicillin (AMOXIL) 500 MG tablet TAKE 4 TABS 30-60 MINUTES BEFORE PROCEDURE 3/21/24   Elmo Hurst MD   naloxone (NARCAN) 4 MG/0.1ML LIQD nasal spray 1 spray by Nasal route as needed for Opioid Reversal 4/15/24   Gallo Max MD       Current medications:    Current Facility-Administered Medications   Medication Dose Route Frequency Provider

## 2024-05-08 NOTE — OP NOTE
NEUROSURGERY OPERATIVE REPORT:     Patient Name:Nidia Thomas    Patient MRN: [id number]    Patient YOB: 1975    Date of Procedure: 5/8/2024    Pre-Op Diagnosis Codes:     * Lumbar radiculopathy [M54.16]     * Lumbar disc herniation [M51.26]     * History of lumbar fusion [Z98.1]    Post-Operative Diagnosis: SAME AS ABOVE    Procedure:    1. Minimally invasive left L3-L4 lumbar laminectomy, medial facetectomy and discectomy with Depomedrol Instilled in the epidural space   2. Microsurgical Technique (Intraoperative Microscope)    Surgeon:   Gallo Max MD    Surgical Assistant:   Frank Davies, Surgical Technician First Assist    Surgical Staff:   See Operative Record     Anesthesia: General Endotracheal Anesthesia    Blood Loss: 25 cc    Specimen: None:     Indication for Surgery:   Nidia Thomas is a 48 y.o. female who presented to clinic for follow-up evaluation of left lower extremity pain. I have independently reviewed and interpreted the patient's MRI lumbar spine without contrast performed at [facility] on 3/6/2024 that demonstrates a prior L5-S1 posterior lumbar interbody fusion and pedicle screw rico fixation with a widely decompressed canal.  At L4-L5 there is a broad-based disc protrusion that effaces the thecal sac but does not result in any significant spinal stenosis.  At L3-L4 there is a left eccentric disc herniation that contacts and compresses the transiting L4 nerve root on the left.  At this time the patient has failed conservative measures for greater than 6 weeks time.  She has some subtle left quad weakness as well as diminished reflexes in the left patellar response.  I recommend proceeding with a minimally invasive left-sided L3-L4 minimally invasive hemilaminectomy, medial facetectomy and discectomy.  I discussed although she has a slight disc protrusion and degenerative disc disease at L4-L5 adjacent to a prior L5-S1 fusion that I do not see any significant

## 2024-05-08 NOTE — ANESTHESIA POSTPROCEDURE EVALUATION
Department of Anesthesiology  Postprocedure Note    Patient: Nidia Thomas  MRN: [id number]  YOB: 1975  Date of evaluation: 5/8/2024    Procedure Summary       Date: 05/08/24 Room / Location: [facility] MAIN OR [address] [place] [address] MAIN OR    Anesthesia Start: 1153 Anesthesia Stop: 1353    Procedure: left-sided L3-L4 minimally invasive hemilaminectomy, medial facetectomy and discectomy (Left: Spine Lumbar) Diagnosis:       Lumbar radiculopathy      Lumbar disc herniation      History of lumbar fusion      (Lumbar radiculopathy [M54.16])      (Lumbar disc herniation [M51.26])      (History of lumbar fusion [Z98.1])    Providers: Gallo Max MD Responsible Provider: Conrado Su DO    Anesthesia Type: general ASA Status: 2            Anesthesia Type: No value filed.    Dania Phase I: Dania Score: 8    Dania Phase II: Dania Score: 10    Anesthesia Post Evaluation    Patient location during evaluation: PACU  Level of consciousness: awake and alert  Airway patency: patent  Nausea & Vomiting: no nausea  Cardiovascular status: hemodynamically stable  Respiratory status: acceptable  Hydration status: euvolemic  Pain management: satisfactory to patient    No notable events documented.

## 2024-05-12 ENCOUNTER — PATIENT MESSAGE (OUTPATIENT)
Dept: NEUROSURGERY | Age: 49
End: 2024-05-12

## 2024-05-13 DIAGNOSIS — G89.18 POST-OPERATIVE PAIN: Primary | ICD-10-CM

## 2024-05-13 RX ORDER — HYDROCODONE BITARTRATE AND ACETAMINOPHEN 5; 325 MG/1; MG/1
1 TABLET ORAL EVERY 4 HOURS PRN
Qty: 30 TABLET | Refills: 0 | Status: SHIPPED | OUTPATIENT
Start: 2024-05-13 | End: 2024-05-18

## 2024-05-13 NOTE — TELEPHONE ENCOUNTER
From: Nidia Thomas  To: Dr. Gallo Max  Sent: 2024 10:39 PM EDT  Subject: Refill Please    Hi there,  This is Esperanza Thomas.   : 1975  [phone]  Pharmacy: [facility] ([phone])    Hello, I am sorry to bother you guys, I know you are very busy. I was wondering if I could have a refill on hydrocodone 10/325 please?   Dr. Max gave me a 5 day supply on Wednesday after my surgery that ended today ().     I really appreciate it. Thank you and have a great day. Esperanza

## 2024-05-22 ENCOUNTER — TELEPHONE (OUTPATIENT)
Dept: NEUROSURGERY | Age: 49
End: 2024-05-22

## 2024-05-22 ENCOUNTER — PATIENT MESSAGE (OUTPATIENT)
Dept: NEUROSURGERY | Age: 49
End: 2024-05-22

## 2024-05-22 ENCOUNTER — NURSE ONLY (OUTPATIENT)
Dept: NEUROSURGERY | Age: 49
End: 2024-05-22

## 2024-05-22 VITALS
WEIGHT: 177.5 LBS | SYSTOLIC BLOOD PRESSURE: 133 MMHG | TEMPERATURE: 98 F | OXYGEN SATURATION: 97 % | HEART RATE: 102 BPM | HEIGHT: 67 IN | DIASTOLIC BLOOD PRESSURE: 100 MMHG | BODY MASS INDEX: 27.86 KG/M2

## 2024-05-22 DIAGNOSIS — M51.26 LUMBAR DISC HERNIATION: ICD-10-CM

## 2024-05-22 DIAGNOSIS — G89.18 POST-OPERATIVE PAIN: Primary | ICD-10-CM

## 2024-05-22 DIAGNOSIS — M54.16 LUMBAR RADICULOPATHY: Primary | ICD-10-CM

## 2024-05-22 RX ORDER — HYDROCODONE BITARTRATE AND ACETAMINOPHEN 5; 325 MG/1; MG/1
1 TABLET ORAL EVERY 6 HOURS PRN
Qty: 20 TABLET | Refills: 0 | Status: SHIPPED | OUTPATIENT
Start: 2024-05-22 | End: 2024-05-27

## 2024-05-22 RX ORDER — METHYLPREDNISOLONE 4 MG/1
TABLET ORAL
Qty: 1 KIT | Refills: 0 | Status: SHIPPED | OUTPATIENT
Start: 2024-05-22 | End: 2024-05-28

## 2024-05-22 NOTE — TELEPHONE ENCOUNTER
Patient states MRI can not be done unless ordered STAT- will place priority to STAT  Patient notified to call radiology scheduling

## 2024-05-23 ENCOUNTER — PATIENT MESSAGE (OUTPATIENT)
Dept: NEUROSURGERY | Age: 49
End: 2024-05-23

## 2024-05-23 ENCOUNTER — TELEPHONE (OUTPATIENT)
Dept: NEUROSURGERY | Age: 49
End: 2024-05-23

## 2024-05-23 DIAGNOSIS — M25.551 HIP PAIN, ACUTE, RIGHT: Primary | ICD-10-CM

## 2024-05-23 NOTE — TELEPHONE ENCOUNTER
From: Nidia Thomas  To: Dr. Gallo Max  Sent: 5/22/2024 11:41 PM EDT  Subject: Another Fall    Hi Allyn,  How are you. You are not going to believe this. I am in shock again myself. After resting all afternoon, around 6:00 my family and I decided to go for a short walk. [place] I wore long pants and tennis shoes.   I have no idea how, it was like I tripped over my own feet. I went flying and landed on my hands and knees with a bad case of road rash.   We went inside, I washed the areas, put neosporin on them, and bandaged them. What is happening to me?? Am I blacking out??  Right now my right side is in a lot of pain, meaning the area to the right of the incision extending to my right hip. Everything is sore, hurting, and pounding.   I hope I did not do any more damage. The area around the incision is tender and bruising from my bathtub fall this morning.   I am including a few pictures of my hands and knees from this evening.   My MRI is today, Thursday at 2:30.   Thank you again for your understanding and compassion. You were absolutely wonderful with me today as I was kind of still in shock. I cannot put into words how comforting you were.   If you could give me a call today I would really appreciate it. [phone].   Thank you for everything. Have a blessed day. Esperanza

## 2024-05-23 NOTE — TELEPHONE ENCOUNTER
From: Nidia Thomas  To: Dr. Gallo Max  Sent: 5/23/2024 11:34 AM EDT  Subject: Fall(s) Follow Up    David Gruber,  How are you today? Thank you so much for your message this morning. I do not want to bother you and call. So I am trying to keep this to a short email.     When I woke up this morning I was and am extremely sore. Tuesday and Wednesday night were more swelling, abrasions, and bleeding. Now everything is bruising. My  took some pictures this morning of the bruising. There is more on my right side than left. Several are one inch from my right hip replacement scar. I didn’t get a lot of sleep because everything hurts. I know this could be much worse and I apologize. I do not want to make it into a big deal. I am just worried and I am thankful for your generosity and help. You have helped me so much and I do not take that for granted. I am going to attach some pictures. Thank you. The MRI is still today at 2:30. Thank you Allyn.

## 2024-05-23 NOTE — PROGRESS NOTES
Subjective: Patient approached the office examination room very tearful. She states earlier this AM she had a hard fall from the bathtub onto her incision and buttocks with new left sided back and lateral hip pain    Consent: verbal consent obtained and given by the patient. Risks of removal include bleeding,infection,reopening and excessive scarring.  Objective:  BP (!) 133/100 (Site: Left Upper Arm)   Pulse (!) 102   Temp 98 °F (36.7 °C) (Temporal)   Ht 1.702 m (5' 7\")   Wt 80.5 kg (177 lb 8 oz)   SpO2 97%   BMI 27.80 kg/m²   Pain Scale: 8/10    General: patient is cooperative; mood and affect are appropriate; denies any fevers,chills or headache; denies any fatigue or weakness  HEENT:trachea is midline; no voice, visual or swallowing difficulty; good range of motion with neck; mucous membranes are normal  Neuro:alert and oriented; denies any dizziness; gait and coordination observed are normal; ambulates without assistance  Wound: lumbar incision healing well with no signs of infection or redness; minimal sacral bruising hue  Number of sutures/staples removed: none    Assessment:  Post op check: Patient is here for initial post-op check. Dr. Max  is the supervising physician in clinic today and approves of today's treatment plan; Dr. Max aware of fall and symptoms; ELSY Cespedes examined patient; verbal orders of Dr. Max- MRI of lumbar spine W/WO and start a Medrol dose pack; patient requests Innervsion      Plan:  Post-op instructions: strict 2-5 pound weight limit; frequent positional changes; no bending ,lifting or twisting;instructed on proper body mechanics; instructed on short distance driving with considerations of no driving in pain,while on opioids and muscle relaxer's and able to safely check vehicle blind spot  Incisional care: not to saturate and pat dry;do not itch scratch or submerge; may ice surrounding area of incisional line TID x 15-20 minutes x 24 hrs  Management and expected

## 2024-05-24 ENCOUNTER — HOSPITAL ENCOUNTER (OUTPATIENT)
Dept: MRI IMAGING | Age: 49
End: 2024-05-24
Attending: NEUROLOGICAL SURGERY
Payer: MEDICARE

## 2024-05-24 ENCOUNTER — HOSPITAL ENCOUNTER (OUTPATIENT)
Dept: GENERAL RADIOLOGY | Age: 49
End: 2024-05-24
Attending: NEUROLOGICAL SURGERY
Payer: MEDICARE

## 2024-05-24 DIAGNOSIS — M25.551 HIP PAIN, ACUTE, RIGHT: ICD-10-CM

## 2024-05-24 PROCEDURE — A9579 GAD-BASE MR CONTRAST NOS,1ML: HCPCS | Performed by: NEUROLOGICAL SURGERY

## 2024-05-24 PROCEDURE — 72158 MRI LUMBAR SPINE W/O & W/DYE: CPT

## 2024-05-24 PROCEDURE — 6360000004 HC RX CONTRAST MEDICATION: Performed by: NEUROLOGICAL SURGERY

## 2024-05-24 PROCEDURE — 73502 X-RAY EXAM HIP UNI 2-3 VIEWS: CPT

## 2024-05-24 PROCEDURE — 2580000003 HC RX 258: Performed by: NEUROLOGICAL SURGERY

## 2024-05-24 RX ORDER — SODIUM CHLORIDE 0.9 % (FLUSH) 0.9 %
10 SYRINGE (ML) INJECTION AS NEEDED
Status: DISCONTINUED | OUTPATIENT
Start: 2024-05-24 | End: 2024-05-28 | Stop reason: HOSPADM

## 2024-05-24 RX ADMIN — GADOTERIDOL 16 ML: 279.3 INJECTION, SOLUTION INTRAVENOUS at 06:42

## 2024-05-24 RX ADMIN — SODIUM CHLORIDE, PRESERVATIVE FREE 10 ML: 5 INJECTION INTRAVENOUS at 06:42

## 2024-05-24 NOTE — TELEPHONE ENCOUNTER
Ulysses explained Dr. Max reviewed her Mri and stated everything was ok. That she may want to reach out to her hip surgeon.   Just kaden

## 2024-05-26 ENCOUNTER — HOSPITAL ENCOUNTER (EMERGENCY)
Age: 49
Discharge: HOME | End: 2024-05-26
Attending: GENERAL PRACTICE
Payer: MEDICARE

## 2024-05-26 VITALS
HEIGHT: 67 IN | OXYGEN SATURATION: 96 % | SYSTOLIC BLOOD PRESSURE: 137 MMHG | BODY MASS INDEX: 27.78 KG/M2 | DIASTOLIC BLOOD PRESSURE: 89 MMHG | WEIGHT: 177 LBS | TEMPERATURE: 98.6 F | HEART RATE: 96 BPM | RESPIRATION RATE: 14 BRPM

## 2024-05-26 DIAGNOSIS — S39.012A STRAIN OF LUMBAR REGION, INITIAL ENCOUNTER: Primary | ICD-10-CM

## 2024-05-26 DIAGNOSIS — M54.50 ACUTE EXACERBATION OF CHRONIC LOW BACK PAIN: ICD-10-CM

## 2024-05-26 DIAGNOSIS — G89.29 ACUTE EXACERBATION OF CHRONIC LOW BACK PAIN: ICD-10-CM

## 2024-05-26 PROCEDURE — 6360000002 HC RX W HCPCS: Performed by: GENERAL PRACTICE

## 2024-05-26 PROCEDURE — 96372 THER/PROPH/DIAG INJ SC/IM: CPT

## 2024-05-26 PROCEDURE — 6370000000 HC RX 637 (ALT 250 FOR IP): Performed by: GENERAL PRACTICE

## 2024-05-26 PROCEDURE — 99284 EMERGENCY DEPT VISIT MOD MDM: CPT

## 2024-05-26 RX ORDER — HYDROMORPHONE HYDROCHLORIDE 1 MG/ML
1 INJECTION, SOLUTION INTRAMUSCULAR; INTRAVENOUS; SUBCUTANEOUS
Status: COMPLETED | OUTPATIENT
Start: 2024-05-26 | End: 2024-05-26

## 2024-05-26 RX ORDER — ONDANSETRON 4 MG/1
4 TABLET, ORALLY DISINTEGRATING ORAL ONCE
Status: COMPLETED | OUTPATIENT
Start: 2024-05-26 | End: 2024-05-26

## 2024-05-26 RX ORDER — HYDROMORPHONE HYDROCHLORIDE 1 MG/ML
2 INJECTION, SOLUTION INTRAMUSCULAR; INTRAVENOUS; SUBCUTANEOUS
Status: COMPLETED | OUTPATIENT
Start: 2024-05-26 | End: 2024-05-26

## 2024-05-26 RX ADMIN — ONDANSETRON 4 MG: 4 TABLET, ORALLY DISINTEGRATING ORAL at 19:29

## 2024-05-26 RX ADMIN — HYDROMORPHONE HYDROCHLORIDE 1 MG: 1 INJECTION, SOLUTION INTRAMUSCULAR; INTRAVENOUS; SUBCUTANEOUS at 19:29

## 2024-05-26 RX ADMIN — HYDROMORPHONE HYDROCHLORIDE 2 MG: 1 INJECTION, SOLUTION INTRAMUSCULAR; INTRAVENOUS; SUBCUTANEOUS at 21:37

## 2024-05-26 ASSESSMENT — PAIN DESCRIPTION - ORIENTATION: ORIENTATION: LEFT

## 2024-05-26 ASSESSMENT — PAIN SCALES - GENERAL
PAINLEVEL_OUTOF10: 9
PAINLEVEL_OUTOF10: 9

## 2024-05-26 ASSESSMENT — PAIN - FUNCTIONAL ASSESSMENT: PAIN_FUNCTIONAL_ASSESSMENT: 0-10

## 2024-05-26 ASSESSMENT — LIFESTYLE VARIABLES
HOW OFTEN DO YOU HAVE A DRINK CONTAINING ALCOHOL: NEVER
HOW MANY STANDARD DRINKS CONTAINING ALCOHOL DO YOU HAVE ON A TYPICAL DAY: PATIENT DOES NOT DRINK

## 2024-05-26 ASSESSMENT — PAIN DESCRIPTION - LOCATION
LOCATION: BACK
LOCATION: BACK

## 2024-05-26 ASSESSMENT — PAIN DESCRIPTION - DESCRIPTORS: DESCRIPTORS: ACHING

## 2024-05-26 ASSESSMENT — PAIN DESCRIPTION - PAIN TYPE: TYPE: ACUTE PAIN

## 2024-05-26 NOTE — ED TRIAGE NOTES
Patient recently had back surgery 2.5 weeks ago, had a fall last week out of her bathtub and a steel shelf fell on top of her, she fell again when walking around the neighborhood, had an MRI and x ray done.     Patient denies any dizziness or LOC but presents with left sided lower back pain    Denies any fevers/chills nausea/vomiting

## 2024-05-27 NOTE — ED PROVIDER NOTES
Emergency Department Provider Note       PCP: Mery Lockhart MD   Age: 48 y.o.   Sex: female     DISPOSITION Decision To Discharge 05/26/2024 10:17:23 PM       ICD-10-CM    1. Strain of lumbar region, initial encounter  S39.012A       2. Acute exacerbation of chronic low back pain  M54.50     G89.29           Medical Decision Making     Patient presents with likely acute on chronic low back pain.  Patient has no red flags or anything to suggest any cauda equina or spinal cord compression.  Patient is well-appearing.  I discussed the case with Dr. Max who remembered her from this week and agreed that at this time no ER workup is needed.  Patient's pain improved and she feels improved and will be discharged home.  Return precautions are given.     1 chronic illness with exacerbation.  Parental controlled substances given in the ED.  Shared medical decision making was utilized in creating the patients health plan today.    I independently ordered and reviewed each unique test.  I reviewed external records: provider visit note from outside specialist.  I reviewed external records: previous imaging study including radiologist interpretation.         The management of this patient was discussed with an external consultant.      Exclusion criteria - the patient is NOT to be included for SEP-1 Core Measure due to: Infection is not suspected       History     Patient presents with low back pain.  Symptoms started acutely when she picked up something and twisted.  She said it was like a shooting pain in the left lumbar region.  She denied any shooting pain down the legs.  She denies any paresthesias in the legs.  She also denies any saddle anesthesia or bowel and bladder change.  Patient had surgery on May 8 by Dr. Gallo Max.  Course was complicated by a fall on Wednesday and an MRI was ordered on Friday which was normal.  Patient denies any fevers or any other symptoms at this time.        ROS     Review of Systems

## 2024-05-27 NOTE — ED NOTES
Patient mobility status  with mild difficulty, using a cane. Provider aware     I have reviewed discharge instructions with the patient.  The patient verbalized understanding.    Patient left ED via Discharge Method: ambulatory to Home with Spouse.    Opportunity for questions and clarification provided.     Patient given 0 scripts.

## 2024-05-29 ENCOUNTER — PATIENT MESSAGE (OUTPATIENT)
Dept: NEUROSURGERY | Age: 49
End: 2024-05-29

## 2024-05-29 DIAGNOSIS — M51.26 LUMBAR DISC HERNIATION: ICD-10-CM

## 2024-05-29 DIAGNOSIS — M54.16 LUMBAR RADICULOPATHY: Primary | ICD-10-CM

## 2024-05-30 DIAGNOSIS — Z98.890 S/P LUMBAR DISCECTOMY: Primary | ICD-10-CM

## 2024-05-30 RX ORDER — CYCLOBENZAPRINE HCL 10 MG
10 TABLET ORAL 3 TIMES DAILY PRN
Qty: 30 TABLET | Refills: 0 | Status: SHIPPED | OUTPATIENT
Start: 2024-05-30 | End: 2024-06-09

## 2024-05-30 RX ORDER — HYDROCODONE BITARTRATE AND ACETAMINOPHEN 10; 325 MG/1; MG/1
1 TABLET ORAL EVERY 8 HOURS PRN
Qty: 15 TABLET | Refills: 0 | Status: SHIPPED | OUTPATIENT
Start: 2024-05-30 | End: 2024-06-04

## 2024-05-30 NOTE — PROGRESS NOTES
Prescription refill ordered and signed. Sent electronically to the pharmacy.     Gallo Max MD FAANS, FCNS  [facility]  [facility]

## 2024-05-31 ENCOUNTER — HOSPITAL ENCOUNTER (EMERGENCY)
Age: 49
Discharge: HOME | End: 2024-05-31
Payer: MEDICARE

## 2024-05-31 VITALS
HEART RATE: 94 BPM | RESPIRATION RATE: 16 BRPM | WEIGHT: 175 LBS | BODY MASS INDEX: 27.47 KG/M2 | TEMPERATURE: 98.6 F | OXYGEN SATURATION: 98 % | DIASTOLIC BLOOD PRESSURE: 86 MMHG | SYSTOLIC BLOOD PRESSURE: 131 MMHG | HEIGHT: 67 IN

## 2024-05-31 DIAGNOSIS — S39.012A BACK STRAIN, INITIAL ENCOUNTER: Primary | ICD-10-CM

## 2024-05-31 PROCEDURE — 96372 THER/PROPH/DIAG INJ SC/IM: CPT

## 2024-05-31 PROCEDURE — 99284 EMERGENCY DEPT VISIT MOD MDM: CPT

## 2024-05-31 PROCEDURE — 6360000002 HC RX W HCPCS: Performed by: PHYSICIAN ASSISTANT

## 2024-05-31 PROCEDURE — 6370000000 HC RX 637 (ALT 250 FOR IP): Performed by: PHYSICIAN ASSISTANT

## 2024-05-31 RX ORDER — LIDOCAINE 4 G/G
1 PATCH TOPICAL
Status: DISCONTINUED | OUTPATIENT
Start: 2024-05-31 | End: 2024-05-31 | Stop reason: HOSPADM

## 2024-05-31 RX ORDER — DIAZEPAM 5 MG/1
5 TABLET ORAL ONCE
Status: COMPLETED | OUTPATIENT
Start: 2024-05-31 | End: 2024-05-31

## 2024-05-31 RX ORDER — HYDROMORPHONE HYDROCHLORIDE 1 MG/ML
0.5 INJECTION, SOLUTION INTRAMUSCULAR; INTRAVENOUS; SUBCUTANEOUS ONCE
Status: COMPLETED | OUTPATIENT
Start: 2024-05-31 | End: 2024-05-31

## 2024-05-31 RX ORDER — MORPHINE SULFATE 4 MG/ML
4 INJECTION, SOLUTION INTRAMUSCULAR; INTRAVENOUS ONCE
Status: COMPLETED | OUTPATIENT
Start: 2024-05-31 | End: 2024-05-31

## 2024-05-31 RX ADMIN — MORPHINE SULFATE 4 MG: 4 INJECTION INTRAVENOUS at 15:39

## 2024-05-31 RX ADMIN — DIAZEPAM 5 MG: 5 TABLET ORAL at 15:39

## 2024-05-31 RX ADMIN — HYDROMORPHONE HYDROCHLORIDE 0.5 MG: 1 INJECTION, SOLUTION INTRAMUSCULAR; INTRAVENOUS; SUBCUTANEOUS at 16:46

## 2024-05-31 ASSESSMENT — PAIN SCALES - GENERAL
PAINLEVEL_OUTOF10: 9
PAINLEVEL_OUTOF10: 9
PAINLEVEL_OUTOF10: 10
PAINLEVEL_OUTOF10: 10
PAINLEVEL_OUTOF10: 7

## 2024-05-31 ASSESSMENT — PAIN DESCRIPTION - LOCATION
LOCATION: BACK

## 2024-05-31 ASSESSMENT — ENCOUNTER SYMPTOMS
SHORTNESS OF BREATH: 0
CHEST TIGHTNESS: 0
NAUSEA: 0
VOMITING: 0
BACK PAIN: 1
EYE REDNESS: 0
RHINORRHEA: 0
ABDOMINAL DISTENTION: 0
COUGH: 0
SORE THROAT: 0
DIARRHEA: 0

## 2024-05-31 ASSESSMENT — PAIN DESCRIPTION - DESCRIPTORS
DESCRIPTORS: ACHING;DISCOMFORT;THROBBING
DESCRIPTORS: ACHING;DISCOMFORT
DESCRIPTORS: ACHING;DISCOMFORT

## 2024-05-31 ASSESSMENT — PAIN DESCRIPTION - PAIN TYPE: TYPE: ACUTE PAIN

## 2024-05-31 ASSESSMENT — PAIN - FUNCTIONAL ASSESSMENT: PAIN_FUNCTIONAL_ASSESSMENT: 0-10

## 2024-05-31 ASSESSMENT — PAIN DESCRIPTION - FREQUENCY: FREQUENCY: CONTINUOUS

## 2024-05-31 NOTE — ED NOTES
Patient mobility status  with mild difficulty. Provider aware     I have reviewed discharge instructions with the patient.  The patient verbalized understanding.    Patient left ED via Discharge Method: ambulatory to Home with Significant Other.    Opportunity for questions and clarification provided.     Patient given 0 scripts.

## 2024-05-31 NOTE — ED PROVIDER NOTES
Emergency Department Provider Note       PCP: Mery Lockhart MD   Age: 48 y.o.   Sex: female     DISPOSITION Decision To Discharge 05/31/2024 05:25:13 PM       ICD-10-CM    1. Back strain, initial encounter  S39.012A           Medical Decision Making     Patient is a 48-year-old female presenting with left lower back pain since last night.  She is 3 weeks postop from the laminectomy discectomy in the L4-L5 region.  She states that last night she dropped her towel and bent forward to pick it up from the ground and when she stood up she felt immediate sharp tearing pain in the left side of her back.  She states that she has been taking her Flexeril and Norco is not able to get comfortable.  Pain does not radiate down her legs she does not have any numbness tingling or weakness of her lower extremities.  Pain does not seem to be the area of her incision.  She feels like she pulled a muscle in her back and is having severe spasms.  She is afebrile, vital signs stable, nontoxic in appearance.  She does have diffuse tenderness to palpation along the left paralumbar musculature with spasm however incision site appears clean dry and intact and without tenderness with touching.  Do suspect muscle spasm and strain from lifting injury.  She was given morphine injection and Valium.  She states she feels a little bit better but still having significant pain.  She is given additional dose of Dilaudid and on reevaluation she is resting comfortably in bed lying on her right side.  She states that she is actually feeling much better following second medications at would like to go home at this time.  Will DC she has medications for both muscle spasm and pain at home.  Recommended rest avoidance of any strenuous activity or lifting of any nature and follow-up with her surgeon next week for reevaluation.  Discussed reasons to return to the ED.  Patient verbalizes understanding and is agreeable to plan.     1 acute, uncomplicated  illness or injury.  Over the counter drug management performed.  Prescription drug management performed.  Parental controlled substances given in the ED.    I independently ordered and reviewed each unique test.                     History     Patient is a 48-year-old female who presents with complaint of left lower back pain since last night.  She states she is 3 weeks out from surgery on her L4-5 region including a laminectomy and discectomy.  She states that last night she went to pick her child up from the ground and after bending forward and standing up she felt immediate sharp pain in her left lower back.  She has been taking her Flexeril and Norco with minimal improvement in her symptoms.  She states the pain is worse with any slight movement she could get comfortable lying on her right side.  Pain does not radiate down her leg.  She has not had any loss of bowel or bladder.  No numbness tingling or weakness to her lower extremities.  She states that she came here hoping to get of pain shot to get the edge off so she could go home and rest.  She does report that she had 2 falls since having her surgery however she had an outpatient MRI a few days prior and all looked well.  She is not having pain along her incision site.    The history is provided by the patient.       ROS     Review of Systems   Constitutional:  Negative for activity change, appetite change, chills, fatigue and fever.   HENT:  Negative for congestion, ear pain, rhinorrhea and sore throat.    Eyes:  Negative for redness.   Respiratory:  Negative for cough, chest tightness and shortness of breath.    Cardiovascular:  Negative for chest pain.   Gastrointestinal:  Negative for abdominal distention, diarrhea, nausea and vomiting.   Musculoskeletal:  Positive for back pain (left lower). Negative for neck pain.   Skin:  Negative for rash.   Neurological:  Negative for light-headedness and headaches.   Psychiatric/Behavioral:  Negative for

## 2024-05-31 NOTE — ED NOTES
Patient ambulatory to desk in triage to tell staff she was having her  return to take her home, she was too uncomfortable to wait in chair.

## 2024-05-31 NOTE — DISCHARGE INSTRUCTIONS
Recommend warm compresses to the back on and off, avoidance of any strenuous activity or lifting anything heavier than 1 pound.  Continue Flexeril and Norco as needed.  Follow-up with Dr. Max for any further evaluation next week.

## 2024-05-31 NOTE — ED TRIAGE NOTES
Patient reports left side lower back pain after bending over to  towel yesterday. Patient denies any fall or trauma, states when she went to stand back up felt sharp pain in left lower back.

## 2024-06-01 ENCOUNTER — APPOINTMENT (OUTPATIENT)
Dept: CT IMAGING | Age: 49
End: 2024-06-01
Payer: MEDICARE

## 2024-06-01 ENCOUNTER — HOSPITAL ENCOUNTER (EMERGENCY)
Age: 49
Discharge: HOME | End: 2024-06-01
Attending: EMERGENCY MEDICINE
Payer: MEDICARE

## 2024-06-01 VITALS
SYSTOLIC BLOOD PRESSURE: 129 MMHG | WEIGHT: 175 LBS | RESPIRATION RATE: 18 BRPM | DIASTOLIC BLOOD PRESSURE: 83 MMHG | OXYGEN SATURATION: 97 % | HEART RATE: 95 BPM | BODY MASS INDEX: 27.47 KG/M2 | TEMPERATURE: 99.7 F | HEIGHT: 67 IN

## 2024-06-01 DIAGNOSIS — M54.42 ACUTE LEFT-SIDED LOW BACK PAIN WITH LEFT-SIDED SCIATICA: Primary | ICD-10-CM

## 2024-06-01 DIAGNOSIS — G89.18 POST-OP PAIN: ICD-10-CM

## 2024-06-01 LAB
ALBUMIN SERPL-MCNC: 2.8 G/DL (ref 3.5–5)
ALBUMIN/GLOB SERPL: 0.9 (ref 1–1.9)
ALP SERPL-CCNC: 348 U/L (ref 35–104)
ALT SERPL-CCNC: 63 U/L (ref 12–65)
ANION GAP SERPL CALC-SCNC: 12 MMOL/L (ref 9–18)
AST SERPL-CCNC: 31 U/L (ref 15–37)
BASOPHILS # BLD: 0 K/UL (ref 0–0.2)
BASOPHILS NFR BLD: 0 % (ref 0–2)
BILIRUB SERPL-MCNC: 0.2 MG/DL (ref 0–1.2)
BUN SERPL-MCNC: 14 MG/DL (ref 6–23)
CALCIUM SERPL-MCNC: 8.7 MG/DL (ref 8.8–10.2)
CHLORIDE SERPL-SCNC: 100 MMOL/L (ref 98–107)
CO2 SERPL-SCNC: 25 MMOL/L (ref 20–28)
CREAT SERPL-MCNC: 0.54 MG/DL (ref 0.6–1.1)
DIFFERENTIAL METHOD BLD: ABNORMAL
EOSINOPHIL # BLD: 0.1 K/UL (ref 0–0.8)
EOSINOPHIL NFR BLD: 1 % (ref 0.5–7.8)
ERYTHROCYTE [DISTWIDTH] IN BLOOD BY AUTOMATED COUNT: 15.9 % (ref 11.9–14.6)
GLOBULIN SER CALC-MCNC: 3.2 G/DL (ref 2.3–3.5)
GLUCOSE SERPL-MCNC: 130 MG/DL (ref 70–99)
HCT VFR BLD AUTO: 34.9 % (ref 35.8–46.3)
HGB BLD-MCNC: 11.1 G/DL (ref 11.7–15.4)
IMM GRANULOCYTES # BLD AUTO: 0 K/UL (ref 0–0.5)
IMM GRANULOCYTES NFR BLD AUTO: 0 % (ref 0–5)
LYMPHOCYTES # BLD: 1.6 K/UL (ref 0.5–4.6)
LYMPHOCYTES NFR BLD: 18 % (ref 13–44)
MCH RBC QN AUTO: 27.4 PG (ref 26.1–32.9)
MCHC RBC AUTO-ENTMCNC: 31.8 G/DL (ref 31.4–35)
MCV RBC AUTO: 86.2 FL (ref 82–102)
MONOCYTES # BLD: 0.5 K/UL (ref 0.1–1.3)
MONOCYTES NFR BLD: 6 % (ref 4–12)
NEUTS SEG # BLD: 6.6 K/UL (ref 1.7–8.2)
NEUTS SEG NFR BLD: 75 % (ref 43–78)
NRBC # BLD: 0 K/UL (ref 0–0.2)
PLATELET # BLD AUTO: 297 K/UL (ref 150–450)
PMV BLD AUTO: 8.8 FL (ref 9.4–12.3)
POTASSIUM SERPL-SCNC: 3.6 MMOL/L (ref 3.5–5.1)
PROT SERPL-MCNC: 5.9 G/DL (ref 6.3–8.2)
RBC # BLD AUTO: 4.05 M/UL (ref 4.05–5.2)
SODIUM SERPL-SCNC: 137 MMOL/L (ref 136–145)
WBC # BLD AUTO: 8.9 K/UL (ref 4.3–11.1)

## 2024-06-01 PROCEDURE — 2580000003 HC RX 258: Performed by: EMERGENCY MEDICINE

## 2024-06-01 PROCEDURE — 96375 TX/PRO/DX INJ NEW DRUG ADDON: CPT

## 2024-06-01 PROCEDURE — 85025 COMPLETE CBC W/AUTO DIFF WBC: CPT

## 2024-06-01 PROCEDURE — 72131 CT LUMBAR SPINE W/O DYE: CPT

## 2024-06-01 PROCEDURE — 6360000002 HC RX W HCPCS: Performed by: EMERGENCY MEDICINE

## 2024-06-01 PROCEDURE — 96374 THER/PROPH/DIAG INJ IV PUSH: CPT

## 2024-06-01 PROCEDURE — 96376 TX/PRO/DX INJ SAME DRUG ADON: CPT

## 2024-06-01 PROCEDURE — 80053 COMPREHEN METABOLIC PANEL: CPT

## 2024-06-01 PROCEDURE — 99284 EMERGENCY DEPT VISIT MOD MDM: CPT

## 2024-06-01 RX ORDER — ONDANSETRON 2 MG/ML
4 INJECTION INTRAMUSCULAR; INTRAVENOUS
Status: COMPLETED | OUTPATIENT
Start: 2024-06-01 | End: 2024-06-01

## 2024-06-01 RX ORDER — HYDROMORPHONE HYDROCHLORIDE 1 MG/ML
1 INJECTION, SOLUTION INTRAMUSCULAR; INTRAVENOUS; SUBCUTANEOUS
Status: COMPLETED | OUTPATIENT
Start: 2024-06-01 | End: 2024-06-01

## 2024-06-01 RX ORDER — DEXAMETHASONE 6 MG/1
6 TABLET ORAL
Qty: 5 TABLET | Refills: 0 | Status: ON HOLD | OUTPATIENT
Start: 2024-06-01 | End: 2024-06-07 | Stop reason: HOSPADM

## 2024-06-01 RX ORDER — DEXAMETHASONE SODIUM PHOSPHATE 10 MG/ML
10 INJECTION INTRAMUSCULAR; INTRAVENOUS EVERY 6 HOURS
Status: DISCONTINUED | OUTPATIENT
Start: 2024-06-01 | End: 2024-06-01

## 2024-06-01 RX ORDER — DEXAMETHASONE SODIUM PHOSPHATE 10 MG/ML
10 INJECTION INTRAMUSCULAR; INTRAVENOUS
Status: COMPLETED | OUTPATIENT
Start: 2024-06-01 | End: 2024-06-01

## 2024-06-01 RX ORDER — 0.9 % SODIUM CHLORIDE 0.9 %
1000 INTRAVENOUS SOLUTION INTRAVENOUS
Status: COMPLETED | OUTPATIENT
Start: 2024-06-01 | End: 2024-06-01

## 2024-06-01 RX ADMIN — SODIUM CHLORIDE 1000 ML: 9 INJECTION, SOLUTION INTRAVENOUS at 19:18

## 2024-06-01 RX ADMIN — HYDROMORPHONE HYDROCHLORIDE 1 MG: 1 INJECTION, SOLUTION INTRAMUSCULAR; INTRAVENOUS; SUBCUTANEOUS at 19:13

## 2024-06-01 RX ADMIN — ONDANSETRON 4 MG: 2 INJECTION INTRAMUSCULAR; INTRAVENOUS at 19:08

## 2024-06-01 RX ADMIN — DEXAMETHASONE SODIUM PHOSPHATE 10 MG: 10 INJECTION INTRAMUSCULAR; INTRAVENOUS at 19:10

## 2024-06-01 RX ADMIN — HYDROMORPHONE HYDROCHLORIDE 1 MG: 1 INJECTION, SOLUTION INTRAMUSCULAR; INTRAVENOUS; SUBCUTANEOUS at 20:45

## 2024-06-01 ASSESSMENT — ENCOUNTER SYMPTOMS
DIARRHEA: 0
COUGH: 0
SHORTNESS OF BREATH: 0
NAUSEA: 0
ABDOMINAL SWELLING: 0
ABDOMINAL PAIN: 0
BOWEL INCONTINENCE: 0
CONSTIPATION: 0
VOMITING: 0
BACK PAIN: 1
COLOR CHANGE: 0

## 2024-06-01 ASSESSMENT — PAIN DESCRIPTION - LOCATION: LOCATION: BACK

## 2024-06-01 ASSESSMENT — PAIN - FUNCTIONAL ASSESSMENT: PAIN_FUNCTIONAL_ASSESSMENT: 0-10

## 2024-06-01 ASSESSMENT — PAIN SCALES - GENERAL
PAINLEVEL_OUTOF10: 9
PAINLEVEL_OUTOF10: 9

## 2024-06-01 ASSESSMENT — PAIN DESCRIPTION - ORIENTATION: ORIENTATION: LOWER

## 2024-06-01 NOTE — ED TRIAGE NOTES
Pt presents with complaint of lower back pain.  Pt had back surgery on May 8th and continues to have uncontrollable pain.  Pt states pain is to the left of the incision.  Pt was seen yesterday, but pain remains uncontrolled.

## 2024-06-01 NOTE — ED PROVIDER NOTES
Emergency Department Provider Note       PCP: Mery Lockhart MD   Age: 48 y.o.   Sex: female     DISPOSITION Decision To Discharge 06/01/2024 08:32:28 PM       ICD-10-CM    1. Acute left-sided low back pain with left-sided sciatica  M54.42       2. Post-op pain  G89.18           Medical Decision Making     Patient with recent surgery with Dr. Mxa.  Has had a couple falls.  Recently stood up from bending over after cleaning house and had increased pain.  Came here yesterday.  Back again today.  CT lumbar spine shows no acute.  No appreciable fluid collection.  No elevated white blood cell count.  Patient received 2 doses of Dilaudid and feeling better.  Will discharge with surgery follow-up outpatient.     1 or more acute illnesses that pose a threat to life or bodily function.   Prescription drug management performed.  Parental controlled substances given in the ED.  Drug therapy given requiring intensive monitoring for toxicity.  Patient was discharged risks and benefits of hospitalization were considered.  Shared medical decision making was utilized in creating the patients health plan today.    I independently ordered and reviewed each unique test.  I reviewed external records: provider visit note from outside specialist.   The patients assessment required an independent historian: .  The reason they were needed is important historical information not provided by the patient.  I interpreted the CT Scan no l spine fracture.              History     Dr. Max performed back surgery May 8.  L3 and L4 hemilaminectomy and facetectomy and discectomy.  Patient was having left lower back pain going down the left leg.  She has a cage just below this L5-S1.  Patient has had 2 falls between the surgery and now.  Had an outpatient MRI May 24 for repeat evaluation with no acute change found.  Patient seen in the ER May 26 with left lower back pain.  Treated with 2 doses of Dilaudid.  She then followed up  yesterday in the ER after bending over and having increased pain when standing back up.  Has pain just to the left of the incision.  Goes down the back of her leg.  Feels worse than previous.  She was discharged home but with continued pain comes back for evaluation.  She has an appointment with pain management June 6.  No saddle anesthesia or change in bowel or bladder function.    The history is provided by the patient. No  was used.   Back Pain  Location:  Lumbar spine  Quality:  Aching  Radiates to:  L thigh  Pain severity:  Moderate  Duration:  2 days  Timing:  Constant  Progression:  Worsening  Chronicity:  New  Context: twisting    Worsened by:  Movement  Associated symptoms: leg pain    Associated symptoms: no abdominal pain, no abdominal swelling, no bladder incontinence, no bowel incontinence, no chest pain, no dysuria, no fever, no headaches, no numbness, no perianal numbness, no tingling and no weakness    Risk factors: recent surgery        ROS     Review of Systems   Constitutional:  Negative for chills and fever.   Respiratory:  Negative for cough and shortness of breath.    Cardiovascular:  Negative for chest pain and palpitations.   Gastrointestinal:  Negative for abdominal pain, bowel incontinence, constipation, diarrhea, nausea and vomiting.   Genitourinary:  Negative for bladder incontinence, dysuria and hematuria.   Musculoskeletal:  Positive for back pain and gait problem. Negative for neck pain.   Skin:  Negative for color change and rash.   Neurological:  Negative for tingling, weakness, numbness and headaches.   All other systems reviewed and are negative.       Physical Exam     Vitals signs and nursing note reviewed:  Vitals:    06/01/24 1919 06/01/24 1922 06/01/24 1923 06/01/24 1924   BP:       Pulse:       Resp:       Temp:       TempSrc:       SpO2: 98% 97% 97% 97%   Weight:       Height:          Physical Exam  Vitals and nursing note reviewed.   Constitutional:

## 2024-06-02 NOTE — ED NOTES
Patient mobility status  with difficulty, uses a cane, pt states cane use from recnet back surgery. . Provider aware     I have reviewed discharge instructions with the patient.  The patient verbalized understanding.    Patient left ED via Discharge Method: ambulatory to Home with Extended Family:.    Opportunity for questions and clarification provided.     Patient given 1 scripts.

## 2024-06-03 ENCOUNTER — PREP FOR PROCEDURE (OUTPATIENT)
Dept: NEUROSURGERY | Age: 49
End: 2024-06-03

## 2024-06-03 ENCOUNTER — APPOINTMENT (OUTPATIENT)
Dept: CT IMAGING | Age: 49
End: 2024-06-03
Payer: MEDICARE

## 2024-06-03 ENCOUNTER — HOSPITAL ENCOUNTER (INPATIENT)
Age: 49
LOS: 4 days | Discharge: HOME | End: 2024-06-07
Attending: INTERNAL MEDICINE | Admitting: INTERNAL MEDICINE
Payer: MEDICARE

## 2024-06-03 ENCOUNTER — ANESTHESIA EVENT (OUTPATIENT)
Dept: SURGERY | Age: 49
End: 2024-06-03
Payer: MEDICARE

## 2024-06-03 ENCOUNTER — HOSPITAL ENCOUNTER (EMERGENCY)
Age: 49
Discharge: ANOTHER HOSPITAL | End: 2024-06-03
Payer: MEDICARE

## 2024-06-03 ENCOUNTER — PATIENT MESSAGE (OUTPATIENT)
Dept: NEUROSURGERY | Age: 49
End: 2024-06-03

## 2024-06-03 VITALS
SYSTOLIC BLOOD PRESSURE: 139 MMHG | RESPIRATION RATE: 16 BRPM | HEART RATE: 95 BPM | OXYGEN SATURATION: 97 % | BODY MASS INDEX: 27.47 KG/M2 | TEMPERATURE: 98.6 F | WEIGHT: 175 LBS | HEIGHT: 67 IN | DIASTOLIC BLOOD PRESSURE: 95 MMHG

## 2024-06-03 DIAGNOSIS — T81.49XA POSTOPERATIVE WOUND ABSCESS: Primary | ICD-10-CM

## 2024-06-03 DIAGNOSIS — G89.18 POST-OPERATIVE PAIN: Primary | ICD-10-CM

## 2024-06-03 PROBLEM — M50.30 DDD (DEGENERATIVE DISC DISEASE), CERVICAL: Status: ACTIVE | Noted: 2022-11-22

## 2024-06-03 PROBLEM — G47.00 INSOMNIA: Status: ACTIVE | Noted: 2024-06-03

## 2024-06-03 PROBLEM — M46.20 PARASPINAL ABSCESS (HCC): Status: ACTIVE | Noted: 2024-06-03

## 2024-06-03 LAB
ALBUMIN SERPL-MCNC: 3.1 G/DL (ref 3.5–5)
ALBUMIN/GLOB SERPL: 0.9 (ref 1–1.9)
ALP SERPL-CCNC: 302 U/L (ref 35–104)
ALT SERPL-CCNC: 41 U/L (ref 12–65)
ANION GAP SERPL CALC-SCNC: 10 MMOL/L (ref 9–18)
AST SERPL-CCNC: 15 U/L (ref 15–37)
BASOPHILS # BLD: 0 K/UL (ref 0–0.2)
BASOPHILS NFR BLD: 1 % (ref 0–2)
BILIRUB SERPL-MCNC: <0.2 MG/DL (ref 0–1.2)
BUN SERPL-MCNC: 12 MG/DL (ref 6–23)
CALCIUM SERPL-MCNC: 9.5 MG/DL (ref 8.8–10.2)
CHLORIDE SERPL-SCNC: 102 MMOL/L (ref 98–107)
CO2 SERPL-SCNC: 30 MMOL/L (ref 20–28)
CREAT SERPL-MCNC: 0.56 MG/DL (ref 0.6–1.1)
DIFFERENTIAL METHOD BLD: ABNORMAL
EOSINOPHIL # BLD: 0.1 K/UL (ref 0–0.8)
EOSINOPHIL NFR BLD: 1 % (ref 0.5–7.8)
ERYTHROCYTE [DISTWIDTH] IN BLOOD BY AUTOMATED COUNT: 15.4 % (ref 11.9–14.6)
ERYTHROCYTE [SEDIMENTATION RATE] IN BLOOD: 36 MM/HR (ref 0–20)
GLOBULIN SER CALC-MCNC: 3.5 G/DL (ref 2.3–3.5)
GLUCOSE SERPL-MCNC: 86 MG/DL (ref 70–99)
HCG SERPL QL: NEGATIVE
HCT VFR BLD AUTO: 37.7 % (ref 35.8–46.3)
HGB BLD-MCNC: 12 G/DL (ref 11.7–15.4)
IMM GRANULOCYTES # BLD AUTO: 0 K/UL (ref 0–0.5)
IMM GRANULOCYTES NFR BLD AUTO: 0 % (ref 0–5)
LACTATE SERPL-SCNC: 0.7 MMOL/L (ref 0.5–2)
LYMPHOCYTES # BLD: 2.7 K/UL (ref 0.5–4.6)
LYMPHOCYTES NFR BLD: 41 % (ref 13–44)
MCH RBC QN AUTO: 27.5 PG (ref 26.1–32.9)
MCHC RBC AUTO-ENTMCNC: 31.8 G/DL (ref 31.4–35)
MCV RBC AUTO: 86.3 FL (ref 82–102)
MONOCYTES # BLD: 0.3 K/UL (ref 0.1–1.3)
MONOCYTES NFR BLD: 5 % (ref 4–12)
NEUTS SEG # BLD: 3.4 K/UL (ref 1.7–8.2)
NEUTS SEG NFR BLD: 52 % (ref 43–78)
NRBC # BLD: 0 K/UL (ref 0–0.2)
PLATELET # BLD AUTO: 364 K/UL (ref 150–450)
PMV BLD AUTO: 8.6 FL (ref 9.4–12.3)
POTASSIUM SERPL-SCNC: 4 MMOL/L (ref 3.5–5.1)
PROCALCITONIN SERPL-MCNC: 0.05 NG/ML (ref 0–0.1)
PROT SERPL-MCNC: 6.6 G/DL (ref 6.3–8.2)
RBC # BLD AUTO: 4.37 M/UL (ref 4.05–5.2)
SODIUM SERPL-SCNC: 142 MMOL/L (ref 136–145)
WBC # BLD AUTO: 6.5 K/UL (ref 4.3–11.1)

## 2024-06-03 PROCEDURE — 6360000004 HC RX CONTRAST MEDICATION: Performed by: PHYSICIAN ASSISTANT

## 2024-06-03 PROCEDURE — 2580000003 HC RX 258: Performed by: INTERNAL MEDICINE

## 2024-06-03 PROCEDURE — 6360000002 HC RX W HCPCS: Performed by: STUDENT IN AN ORGANIZED HEALTH CARE EDUCATION/TRAINING PROGRAM

## 2024-06-03 PROCEDURE — 6360000002 HC RX W HCPCS: Performed by: PHYSICIAN ASSISTANT

## 2024-06-03 PROCEDURE — 87077 CULTURE AEROBIC IDENTIFY: CPT

## 2024-06-03 PROCEDURE — 72132 CT LUMBAR SPINE W/DYE: CPT

## 2024-06-03 PROCEDURE — 87205 SMEAR GRAM STAIN: CPT

## 2024-06-03 PROCEDURE — 96365 THER/PROPH/DIAG IV INF INIT: CPT

## 2024-06-03 PROCEDURE — 96374 THER/PROPH/DIAG INJ IV PUSH: CPT

## 2024-06-03 PROCEDURE — 6370000000 HC RX 637 (ALT 250 FOR IP): Performed by: INTERNAL MEDICINE

## 2024-06-03 PROCEDURE — 2580000003 HC RX 258: Performed by: PHYSICIAN ASSISTANT

## 2024-06-03 PROCEDURE — 85025 COMPLETE CBC W/AUTO DIFF WBC: CPT

## 2024-06-03 PROCEDURE — 83605 ASSAY OF LACTIC ACID: CPT

## 2024-06-03 PROCEDURE — 96375 TX/PRO/DX INJ NEW DRUG ADDON: CPT

## 2024-06-03 PROCEDURE — 84145 PROCALCITONIN (PCT): CPT

## 2024-06-03 PROCEDURE — 96376 TX/PRO/DX INJ SAME DRUG ADON: CPT

## 2024-06-03 PROCEDURE — 86140 C-REACTIVE PROTEIN: CPT

## 2024-06-03 PROCEDURE — 99024 POSTOP FOLLOW-UP VISIT: CPT | Performed by: NURSE PRACTITIONER

## 2024-06-03 PROCEDURE — 87075 CULTR BACTERIA EXCEPT BLOOD: CPT

## 2024-06-03 PROCEDURE — 36415 COLL VENOUS BLD VENIPUNCTURE: CPT

## 2024-06-03 PROCEDURE — 87186 SC STD MICRODIL/AGAR DIL: CPT

## 2024-06-03 PROCEDURE — 2580000003 HC RX 258: Performed by: STUDENT IN AN ORGANIZED HEALTH CARE EDUCATION/TRAINING PROGRAM

## 2024-06-03 PROCEDURE — 99285 EMERGENCY DEPT VISIT HI MDM: CPT

## 2024-06-03 PROCEDURE — 6360000002 HC RX W HCPCS: Performed by: INTERNAL MEDICINE

## 2024-06-03 PROCEDURE — 6370000000 HC RX 637 (ALT 250 FOR IP): Performed by: HOSPITALIST

## 2024-06-03 PROCEDURE — 80053 COMPREHEN METABOLIC PANEL: CPT

## 2024-06-03 PROCEDURE — 1100000000 HC RM PRIVATE

## 2024-06-03 PROCEDURE — 87070 CULTURE OTHR SPECIMN AEROBIC: CPT

## 2024-06-03 PROCEDURE — 2580000003 HC RX 258: Performed by: ANESTHESIOLOGY

## 2024-06-03 PROCEDURE — 85652 RBC SED RATE AUTOMATED: CPT

## 2024-06-03 PROCEDURE — 87040 BLOOD CULTURE FOR BACTERIA: CPT

## 2024-06-03 PROCEDURE — 6370000000 HC RX 637 (ALT 250 FOR IP): Performed by: STUDENT IN AN ORGANIZED HEALTH CARE EDUCATION/TRAINING PROGRAM

## 2024-06-03 PROCEDURE — 84703 CHORIONIC GONADOTROPIN ASSAY: CPT

## 2024-06-03 RX ORDER — HYDROMORPHONE HYDROCHLORIDE 1 MG/ML
0.5 INJECTION, SOLUTION INTRAMUSCULAR; INTRAVENOUS; SUBCUTANEOUS
Status: DISCONTINUED | OUTPATIENT
Start: 2024-06-03 | End: 2024-06-04

## 2024-06-03 RX ORDER — ACETAMINOPHEN 650 MG/1
650 SUPPOSITORY RECTAL EVERY 6 HOURS PRN
Status: DISCONTINUED | OUTPATIENT
Start: 2024-06-03 | End: 2024-06-07 | Stop reason: HOSPADM

## 2024-06-03 RX ORDER — ZOLPIDEM TARTRATE 5 MG/1
5 TABLET ORAL NIGHTLY PRN
Status: DISCONTINUED | OUTPATIENT
Start: 2024-06-03 | End: 2024-06-07 | Stop reason: HOSPADM

## 2024-06-03 RX ORDER — ONDANSETRON 2 MG/ML
4 INJECTION INTRAMUSCULAR; INTRAVENOUS
Status: DISCONTINUED | OUTPATIENT
Start: 2024-06-03 | End: 2024-06-04 | Stop reason: HOSPADM

## 2024-06-03 RX ORDER — NALOXONE HYDROCHLORIDE 0.4 MG/ML
INJECTION, SOLUTION INTRAMUSCULAR; INTRAVENOUS; SUBCUTANEOUS PRN
Status: DISCONTINUED | OUTPATIENT
Start: 2024-06-03 | End: 2024-06-04 | Stop reason: HOSPADM

## 2024-06-03 RX ORDER — ONDANSETRON 2 MG/ML
4 INJECTION INTRAMUSCULAR; INTRAVENOUS
Status: COMPLETED | OUTPATIENT
Start: 2024-06-03 | End: 2024-06-03

## 2024-06-03 RX ORDER — ONDANSETRON 2 MG/ML
4 INJECTION INTRAMUSCULAR; INTRAVENOUS EVERY 6 HOURS PRN
Status: DISCONTINUED | OUTPATIENT
Start: 2024-06-03 | End: 2024-06-07 | Stop reason: HOSPADM

## 2024-06-03 RX ORDER — SODIUM CHLORIDE 9 MG/ML
INJECTION, SOLUTION INTRAVENOUS PRN
Status: DISCONTINUED | OUTPATIENT
Start: 2024-06-03 | End: 2024-06-04 | Stop reason: HOSPADM

## 2024-06-03 RX ORDER — SODIUM CHLORIDE 0.9 % (FLUSH) 0.9 %
5-40 SYRINGE (ML) INJECTION EVERY 12 HOURS SCHEDULED
Status: DISCONTINUED | OUTPATIENT
Start: 2024-06-03 | End: 2024-06-04 | Stop reason: SDUPTHER

## 2024-06-03 RX ORDER — POTASSIUM CHLORIDE 7.45 MG/ML
10 INJECTION INTRAVENOUS PRN
Status: DISCONTINUED | OUTPATIENT
Start: 2024-06-03 | End: 2024-06-07 | Stop reason: HOSPADM

## 2024-06-03 RX ORDER — HYDROMORPHONE HYDROCHLORIDE 1 MG/ML
1 INJECTION, SOLUTION INTRAMUSCULAR; INTRAVENOUS; SUBCUTANEOUS
Status: DISCONTINUED | OUTPATIENT
Start: 2024-06-03 | End: 2024-06-04

## 2024-06-03 RX ORDER — ALPRAZOLAM 0.5 MG/1
1 TABLET ORAL 2 TIMES DAILY PRN
Status: DISCONTINUED | OUTPATIENT
Start: 2024-06-03 | End: 2024-06-03

## 2024-06-03 RX ORDER — POLYETHYLENE GLYCOL 3350 17 G/17G
17 POWDER, FOR SOLUTION ORAL DAILY PRN
Status: DISCONTINUED | OUTPATIENT
Start: 2024-06-03 | End: 2024-06-07 | Stop reason: HOSPADM

## 2024-06-03 RX ORDER — CLONAZEPAM 2 MG/1
2 TABLET ORAL NIGHTLY PRN
COMMUNITY

## 2024-06-03 RX ORDER — ALPRAZOLAM 0.5 MG/1
1 TABLET ORAL 2 TIMES DAILY PRN
Status: DISCONTINUED | OUTPATIENT
Start: 2024-06-03 | End: 2024-06-07 | Stop reason: HOSPADM

## 2024-06-03 RX ORDER — HYDROMORPHONE HYDROCHLORIDE 2 MG/ML
0.5 INJECTION, SOLUTION INTRAMUSCULAR; INTRAVENOUS; SUBCUTANEOUS EVERY 5 MIN PRN
Status: DISCONTINUED | OUTPATIENT
Start: 2024-06-03 | End: 2024-06-04 | Stop reason: HOSPADM

## 2024-06-03 RX ORDER — HYDROMORPHONE HYDROCHLORIDE 1 MG/ML
1 INJECTION, SOLUTION INTRAMUSCULAR; INTRAVENOUS; SUBCUTANEOUS
Status: COMPLETED | OUTPATIENT
Start: 2024-06-03 | End: 2024-06-03

## 2024-06-03 RX ORDER — ROPINIROLE 0.5 MG/1
1 TABLET, FILM COATED ORAL NIGHTLY
Status: DISCONTINUED | OUTPATIENT
Start: 2024-06-03 | End: 2024-06-07 | Stop reason: HOSPADM

## 2024-06-03 RX ORDER — POTASSIUM CHLORIDE 20 MEQ/1
40 TABLET, EXTENDED RELEASE ORAL PRN
Status: DISCONTINUED | OUTPATIENT
Start: 2024-06-03 | End: 2024-06-07 | Stop reason: HOSPADM

## 2024-06-03 RX ORDER — SODIUM CHLORIDE 0.9 % (FLUSH) 0.9 %
5-40 SYRINGE (ML) INJECTION EVERY 12 HOURS SCHEDULED
Status: DISCONTINUED | OUTPATIENT
Start: 2024-06-03 | End: 2024-06-04 | Stop reason: HOSPADM

## 2024-06-03 RX ORDER — SODIUM CHLORIDE 9 MG/ML
INJECTION, SOLUTION INTRAVENOUS PRN
Status: DISCONTINUED | OUTPATIENT
Start: 2024-06-03 | End: 2024-06-07 | Stop reason: HOSPADM

## 2024-06-03 RX ORDER — MAGNESIUM SULFATE IN WATER 40 MG/ML
2000 INJECTION, SOLUTION INTRAVENOUS PRN
Status: DISCONTINUED | OUTPATIENT
Start: 2024-06-03 | End: 2024-06-07 | Stop reason: HOSPADM

## 2024-06-03 RX ORDER — MIDAZOLAM HYDROCHLORIDE 2 MG/2ML
2 INJECTION, SOLUTION INTRAMUSCULAR; INTRAVENOUS
Status: DISCONTINUED | OUTPATIENT
Start: 2024-06-03 | End: 2024-06-04 | Stop reason: HOSPADM

## 2024-06-03 RX ORDER — HYDROCODONE BITARTRATE AND ACETAMINOPHEN 10; 325 MG/1; MG/1
1 TABLET ORAL EVERY 8 HOURS PRN
Status: DISCONTINUED | OUTPATIENT
Start: 2024-06-03 | End: 2024-06-05

## 2024-06-03 RX ORDER — SODIUM CHLORIDE 0.9 % (FLUSH) 0.9 %
5-40 SYRINGE (ML) INJECTION PRN
Status: DISCONTINUED | OUTPATIENT
Start: 2024-06-03 | End: 2024-06-04 | Stop reason: SDUPTHER

## 2024-06-03 RX ORDER — PROCHLORPERAZINE EDISYLATE 5 MG/ML
5 INJECTION INTRAMUSCULAR; INTRAVENOUS
Status: DISCONTINUED | OUTPATIENT
Start: 2024-06-03 | End: 2024-06-04 | Stop reason: HOSPADM

## 2024-06-03 RX ORDER — DIPHENHYDRAMINE HYDROCHLORIDE 50 MG/ML
12.5 INJECTION INTRAMUSCULAR; INTRAVENOUS
Status: DISCONTINUED | OUTPATIENT
Start: 2024-06-03 | End: 2024-06-04 | Stop reason: HOSPADM

## 2024-06-03 RX ORDER — PANTOPRAZOLE SODIUM 40 MG/1
40 TABLET, DELAYED RELEASE ORAL
Status: DISCONTINUED | OUTPATIENT
Start: 2024-06-04 | End: 2024-06-07 | Stop reason: HOSPADM

## 2024-06-03 RX ORDER — 0.9 % SODIUM CHLORIDE 0.9 %
1000 INTRAVENOUS SOLUTION INTRAVENOUS
Status: COMPLETED | OUTPATIENT
Start: 2024-06-03 | End: 2024-06-03

## 2024-06-03 RX ORDER — LIDOCAINE HYDROCHLORIDE 10 MG/ML
1 INJECTION, SOLUTION INFILTRATION; PERINEURAL
Status: DISCONTINUED | OUTPATIENT
Start: 2024-06-03 | End: 2024-06-04 | Stop reason: HOSPADM

## 2024-06-03 RX ORDER — ZOLPIDEM TARTRATE 5 MG/1
12.5 TABLET ORAL NIGHTLY PRN
Status: DISCONTINUED | OUTPATIENT
Start: 2024-06-03 | End: 2024-06-03

## 2024-06-03 RX ORDER — ONDANSETRON 4 MG/1
4 TABLET, ORALLY DISINTEGRATING ORAL EVERY 8 HOURS PRN
Status: DISCONTINUED | OUTPATIENT
Start: 2024-06-03 | End: 2024-06-07 | Stop reason: HOSPADM

## 2024-06-03 RX ORDER — CYCLOBENZAPRINE HCL 10 MG
10 TABLET ORAL 3 TIMES DAILY PRN
Status: DISCONTINUED | OUTPATIENT
Start: 2024-06-03 | End: 2024-06-07 | Stop reason: HOSPADM

## 2024-06-03 RX ORDER — SODIUM CHLORIDE 0.9 % (FLUSH) 0.9 %
5-40 SYRINGE (ML) INJECTION PRN
Status: DISCONTINUED | OUTPATIENT
Start: 2024-06-03 | End: 2024-06-04 | Stop reason: HOSPADM

## 2024-06-03 RX ORDER — ACETAMINOPHEN 500 MG
1000 TABLET ORAL ONCE
Status: COMPLETED | OUTPATIENT
Start: 2024-06-03 | End: 2024-06-04

## 2024-06-03 RX ORDER — ACETAMINOPHEN 325 MG/1
650 TABLET ORAL EVERY 6 HOURS PRN
Status: DISCONTINUED | OUTPATIENT
Start: 2024-06-03 | End: 2024-06-07 | Stop reason: HOSPADM

## 2024-06-03 RX ORDER — BISACODYL 5 MG/1
5 TABLET, DELAYED RELEASE ORAL DAILY
Status: DISCONTINUED | OUTPATIENT
Start: 2024-06-04 | End: 2024-06-07 | Stop reason: HOSPADM

## 2024-06-03 RX ORDER — SODIUM CHLORIDE, SODIUM LACTATE, POTASSIUM CHLORIDE, CALCIUM CHLORIDE 600; 310; 30; 20 MG/100ML; MG/100ML; MG/100ML; MG/100ML
INJECTION, SOLUTION INTRAVENOUS CONTINUOUS
Status: DISCONTINUED | OUTPATIENT
Start: 2024-06-03 | End: 2024-06-04 | Stop reason: HOSPADM

## 2024-06-03 RX ORDER — HYDROMORPHONE HYDROCHLORIDE 1 MG/ML
1.5 INJECTION, SOLUTION INTRAMUSCULAR; INTRAVENOUS; SUBCUTANEOUS ONCE
Status: COMPLETED | OUTPATIENT
Start: 2024-06-03 | End: 2024-06-03

## 2024-06-03 RX ORDER — ESTRADIOL 1 MG/1
1 TABLET ORAL DAILY
Status: DISCONTINUED | OUTPATIENT
Start: 2024-06-04 | End: 2024-06-07 | Stop reason: HOSPADM

## 2024-06-03 RX ADMIN — HYDROCODONE BITARTRATE AND ACETAMINOPHEN 1 TABLET: 10; 325 TABLET ORAL at 22:17

## 2024-06-03 RX ADMIN — ZOLPIDEM TARTRATE 5 MG: 5 TABLET ORAL at 22:17

## 2024-06-03 RX ADMIN — CYCLOBENZAPRINE 10 MG: 10 TABLET, FILM COATED ORAL at 20:48

## 2024-06-03 RX ADMIN — ALPRAZOLAM 1 MG: 0.5 TABLET ORAL at 21:02

## 2024-06-03 RX ADMIN — CEFEPIME 2000 MG: 2 INJECTION, POWDER, FOR SOLUTION INTRAVENOUS at 20:54

## 2024-06-03 RX ADMIN — HYDROMORPHONE HYDROCHLORIDE 1 MG: 1 INJECTION, SOLUTION INTRAMUSCULAR; INTRAVENOUS; SUBCUTANEOUS at 15:55

## 2024-06-03 RX ADMIN — IOPAMIDOL 100 ML: 755 INJECTION, SOLUTION INTRAVENOUS at 14:23

## 2024-06-03 RX ADMIN — PIPERACILLIN AND TAZOBACTAM 4500 MG: 4; .5 INJECTION, POWDER, LYOPHILIZED, FOR SOLUTION INTRAVENOUS at 13:52

## 2024-06-03 RX ADMIN — SODIUM CHLORIDE 1000 ML: 9 INJECTION, SOLUTION INTRAVENOUS at 13:46

## 2024-06-03 RX ADMIN — SODIUM CHLORIDE 1000 ML: 9 INJECTION, SOLUTION INTRAVENOUS at 15:56

## 2024-06-03 RX ADMIN — SODIUM CHLORIDE, POTASSIUM CHLORIDE, SODIUM LACTATE AND CALCIUM CHLORIDE: 600; 310; 30; 20 INJECTION, SOLUTION INTRAVENOUS at 20:48

## 2024-06-03 RX ADMIN — HYDROMORPHONE HYDROCHLORIDE 1.5 MG: 1 INJECTION, SOLUTION INTRAMUSCULAR; INTRAVENOUS; SUBCUTANEOUS at 17:37

## 2024-06-03 RX ADMIN — Medication 10 ML: at 20:48

## 2024-06-03 RX ADMIN — HYDROMORPHONE HYDROCHLORIDE 1 MG: 1 INJECTION, SOLUTION INTRAMUSCULAR; INTRAVENOUS; SUBCUTANEOUS at 13:41

## 2024-06-03 RX ADMIN — HYDROMORPHONE HYDROCHLORIDE 1 MG: 1 INJECTION, SOLUTION INTRAMUSCULAR; INTRAVENOUS; SUBCUTANEOUS at 23:55

## 2024-06-03 RX ADMIN — VANCOMYCIN HYDROCHLORIDE 2000 MG: 10 INJECTION, POWDER, LYOPHILIZED, FOR SOLUTION INTRAVENOUS at 17:28

## 2024-06-03 RX ADMIN — HYDROMORPHONE HYDROCHLORIDE 1 MG: 1 INJECTION, SOLUTION INTRAMUSCULAR; INTRAVENOUS; SUBCUTANEOUS at 20:48

## 2024-06-03 RX ADMIN — ONDANSETRON 4 MG: 2 INJECTION INTRAMUSCULAR; INTRAVENOUS at 13:41

## 2024-06-03 RX ADMIN — ROPINIROLE HYDROCHLORIDE 1 MG: 0.5 TABLET, FILM COATED ORAL at 20:48

## 2024-06-03 ASSESSMENT — PAIN DESCRIPTION - ONSET
ONSET: ON-GOING

## 2024-06-03 ASSESSMENT — PAIN DESCRIPTION - ORIENTATION
ORIENTATION: MID;LOWER;POSTERIOR
ORIENTATION: MID;LOWER;LEFT
ORIENTATION: MID;POSTERIOR;LOWER
ORIENTATION: MID;POSTERIOR;LOWER

## 2024-06-03 ASSESSMENT — PAIN DESCRIPTION - PAIN TYPE
TYPE: ACUTE PAIN;CHRONIC PAIN
TYPE: ACUTE PAIN;CHRONIC PAIN
TYPE: CHRONIC PAIN;ACUTE PAIN

## 2024-06-03 ASSESSMENT — PAIN DESCRIPTION - FREQUENCY
FREQUENCY: CONTINUOUS

## 2024-06-03 ASSESSMENT — PAIN DESCRIPTION - DESCRIPTORS
DESCRIPTORS: BURNING;ACHING
DESCRIPTORS: ACHING;SORE;SPASM
DESCRIPTORS: ACHING;SORE

## 2024-06-03 ASSESSMENT — PAIN DESCRIPTION - LOCATION
LOCATION: BACK

## 2024-06-03 ASSESSMENT — PAIN SCALES - GENERAL
PAINLEVEL_OUTOF10: 10
PAINLEVEL_OUTOF10: 0
PAINLEVEL_OUTOF10: 9
PAINLEVEL_OUTOF10: 0
PAINLEVEL_OUTOF10: 10
PAINLEVEL_OUTOF10: 10

## 2024-06-03 ASSESSMENT — PAIN - FUNCTIONAL ASSESSMENT
PAIN_FUNCTIONAL_ASSESSMENT: PREVENTS OR INTERFERES SOME ACTIVE ACTIVITIES AND ADLS
PAIN_FUNCTIONAL_ASSESSMENT: PREVENTS OR INTERFERES SOME ACTIVE ACTIVITIES AND ADLS
PAIN_FUNCTIONAL_ASSESSMENT: 0-10
PAIN_FUNCTIONAL_ASSESSMENT: PREVENTS OR INTERFERES SOME ACTIVE ACTIVITIES AND ADLS

## 2024-06-03 NOTE — ED TRIAGE NOTES
Pt was here on Friday and Saturday for back pain. She had lower back surgery 3 weeks ago. Around 11 this morning she turned and her incisions burst open and yellow fluid was coming out of it.

## 2024-06-03 NOTE — ED NOTES
TRANSFER - OUT REPORT:    Verbal report given to Jd on Nidia Thomas  being transferred to [facility] for routine progression of patient care       Report consisted of patient's Situation, Background, Assessment and   Recommendations(SBAR).     Information from the following report(s) Nurse Handoff Report, ED Encounter Summary, ED SBAR, and MAR was reviewed with the receiving nurse.    Lines:   Peripheral IV 06/03/24 Left Antecubital (Active)   Site Assessment Clean, dry & intact 06/03/24 1316   Line Status Brisk blood return;Capped;Flushed;Normal saline locked;Specimen collected 06/03/24 1316   Phlebitis Assessment No symptoms 06/03/24 1316   Infiltration Assessment 0 06/03/24 1316   Dressing Status Clean, dry & intact;New dressing applied 06/03/24 1316   Dressing Type Transparent 06/03/24 1316   Dressing Intervention New 06/03/24 1316       Peripheral IV 06/03/24 Posterior;Right Wrist (Active)   Site Assessment Clean, dry & intact 06/03/24 1407   Line Status Blood return noted 06/03/24 1407   Phlebitis Assessment No symptoms 06/03/24 1407   Infiltration Assessment 0 06/03/24 1407        Opportunity for questions and clarification was provided.      Patient transported with:  Tech

## 2024-06-03 NOTE — PROGRESS NOTES
[facility]    Pt presents to ER with complaint of purulent wound drainage that started today.  Pt underwent a L5-S1 TLIF with Dr. Max on 5/8/24.  Pt had an unremarkable wound check and has had both a new lumbar MRI and lumbar CT scan that did not reveal any evidence of infection.  Pt has called several times in the post operative setting reporting many falls in which she landing on the surgical site.  No hardware instability or abnormality or fracture has been noted.  Pt does not have an elevated WBC, no elevated lactic acid, no fever, or body chills  indicating infection.  ER provider states purulent drainage can be squeezed from surgical incision site.  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  Blood cultures were sent in the ER.  Will draw a ESR and C reactive protein. Pt already given 1 dose of zosyn while in the ER. Have NPO after midnight and plan to take patient to OR tomorrow afternoon for a wound washout and wound vac placement.    Patient Vitals for the past 12 hrs:   Temp Pulse Resp BP SpO2   06/03/24 1141 98.6 °F (37 °C) 89 18 115/80 97 %        Recent Results (from the past 24 hour(s))   CBC with Auto Differential    Collection Time: 06/03/24  1:11 PM   Result Value Ref Range    WBC 6.5 4.3 - 11.1 K/uL    RBC 4.37 4.05 - 5.2 M/uL    Hemoglobin 12.0 11.7 - 15.4 g/dL    Hematocrit 37.7 35.8 - 46.3 %    MCV 86.3 82.0 - 102.0 FL    MCH 27.5 26.1 - 32.9 PG    MCHC 31.8 31.4 - 35.0 g/dL    RDW 15.4 (H) 11.9 - 14.6 %    Platelets 364 150 - 450 K/uL    MPV 8.6 (L) 9.4 - 12.3 FL    nRBC 0.00 0.0 - 0.2 K/uL    Differential Type AUTOMATED      Neutrophils % 52 43 - 78 %    Lymphocytes % 41 13 - 44 %    Monocytes % 5 4.0 - 12.0 %    Eosinophils % 1 0.5 - 7.8 %    Basophils % 1 0.0 - 2.0 %    Immature Granulocytes % 0 0.0 - 5.0 %    Neutrophils Absolute 3.4 1.7 - 8.2 K/UL    Lymphocytes Absolute 2.7 0.5 - 4.6 K/UL    Monocytes Absolute 0.3 0.1 - 1.3 K/UL    Eosinophils

## 2024-06-03 NOTE — H&P
Hospitalist History and Physical   Admit Date:  6/3/2024 12:06 PM   Name:  Nidia Thomas   Age:  48 y.o.  Sex:  female  :  1975   MRN:  [id number]   Room:  [place] [address]    Presenting/Chief Complaint: Back Pain and Wound Infection     Reason(s) for Admission: Paraspinal abscess    History of Present Illness:   Nidia Thomas is a 48 y.o. female with past medical history of insomnia and mood disorder who presented to the ED on 6/3/2024 with cc of back pain.  She underwent L5-S1 TLIF with Dr Max on 24.  Patient had been doing well postop until she had a fall around  and landed on the incision site.  She had an MRI a couple days later that showed no acute abnormalities.  She continued to have worsening back pain and was seen in the ED on  and .  Her pain continued to worsen and on 6/3, she noted drainage from the incision site and return to the ED.  In the ED, labs were mostly unremarkable with WBC 6.5 and ESR 36.  CT lumbar spine with contrast showed abscess of the posterior lumbar soft tissue/paraspinal musculature without findings to suggest extension to the spinal canal.  Neurosurgery was consulted and recommended transfer to [facility] for admission and n.p.o. after midnight for potential surgery on .    Assessment & Plan:     Paraspinal abscess  S/p L5-S1 TLIF ()  Received Zosyn and vancomycin in the ED  Continue cefepime and vancomycin  N.p.o. after midnight for potential surgery on   Neurosurgery following  Analgesics as needed    Mood disorder  Continue home Klonopin    Insomnia  Continue home Ambien    Restless leg syndrome  Continue home ropinirole    GERD  Continue Protonix    Dispo: Transfer to [facility] for continued care.   PT/OT evals and PPD needed/ordered?  No  Diet: ADULT DIET; Regular  Diet NPO  VTE prophylaxis: SCD's   Code status: Full    Non-peripheral Lines and Tubes (if present):     none    Hospital Problems:  Principal Problem:    Paraspinal abscess

## 2024-06-03 NOTE — PROGRESS NOTES
VANCO DAILY FOLLOW UP NOTE  [facility]   Pharmacy Pharmacokinetic Monitoring Service - Vancomycin    Consulting Provider: Dr. Knox   Indication: Surgical site infection  Target Concentration: Goal AUC/EYAD 400-600 mg*hr/L  Day of Therapy: 1  Additional Antimicrobials: cefepime    Pertinent Laboratory Values:   Wt Readings from Last 1 Encounters:   06/03/24 79.4 kg (175 lb)     Temp Readings from Last 1 Encounters:   06/03/24 98.6 °F (37 °C) (Oral)     Recent Labs     06/01/24  1856 06/03/24  1311   BUN 14 12   CREATININE 0.54* 0.56*   WBC 8.9 6.5   PROCAL  --  0.05   LACTA  --  0.7     Estimated Creatinine Clearance: 130 mL/min (A) (based on SCr of 0.56 mg/dL (L)).    No results found for: \"VANCOTROUGH\", \"VANCORANDOM\"    MRSA Nasal Swab: N/A. Non-respiratory infection    Assessment:  Date/Time Dose Concentration AUC         Note: Serum concentrations collected for AUC dosing may appear elevated if collected in close proximity to the dose administered, this is not necessarily an indication of toxicity    Plan:  Dosing recommendations based on Bayesian software  Start vancomycin 1500 mg IV every 12 hours  Anticipated AUC of 499 and trough concentration of 11.8 at steady state  Renal labs as indicated   Vancomycin concentrations will be ordered as clinically appropriate  Pharmacy will continue to monitor patient and adjust therapy as indicated    Thank you for the consult,  Yan Gray RPH

## 2024-06-03 NOTE — ED PROVIDER NOTES
CYCLOBENZAPRINE (FLEXERIL) 10 MG TABLET    Take 1 tablet by mouth 3 times daily as needed for Muscle spasms    DEXAMETHASONE (DECADRON) 6 MG TABLET    Take 1 tablet by mouth daily (with breakfast) for 5 days    ESTRADIOL (ESTRACE) 1 MG TABLET    Take 1 tablet by mouth daily    HYDROCODONE-ACETAMINOPHEN (NORCO)  MG PER TABLET    Take 1 tablet by mouth every 8 hours as needed for Pain for up to 5 days. Intended supply: 30 days Max Daily Amount: 3 tablets    MULTIPLE VITAMINS-MINERALS (CENTRUM SILVER ADULT 50+) TABS    Take 1 tablet by mouth daily    NALOXONE (NARCAN) 4 MG/0.1ML LIQD NASAL SPRAY    1 spray by Nasal route as needed for Opioid Reversal    NONFORMULARY    Take 4 tablets by mouth daily Nutrafol    OMEPRAZOLE (PRILOSEC) 40 MG DELAYED RELEASE CAPSULE    Take 1 capsule by mouth daily    ONDANSETRON (ZOFRAN-ODT) 4 MG DISINTEGRATING TABLET    Take 1 tablet by mouth 3 times daily as needed for Nausea or Vomiting    ROPINIROLE (REQUIP) 1 MG TABLET    Take 1 tablet by mouth nightly    ZOLPIDEM (AMBIEN CR) 12.5 MG EXTENDED RELEASE TABLET    Take 1 tablet by mouth nightly.        Results for orders placed or performed during the hospital encounter of 06/03/24   CT LUMBAR SPINE W CONTRAST    Narrative    CT lumbar spine with contrast.    COMPARISON: 1 June 2024    INDICATION: Postop wound abscess    TECHNIQUE: Contrast-enhanced CT images obtained through the lumbar spine with  contrast. Coronal and sagittal reformats obtained. 100 mL Isovue 370  administered.    FINDINGS: Again demonstrated are the postsurgical changes of posterior fusion at  the L5-S1 level. Hemilaminectomy on the left at the L4 level. There is a 5.5 x  2.2 x 4.3 cm mildly rim-enhancing fluid collection arising just left of midline  from the left paraspinal musculature at the L3 level and extending inferiorly to  the L4 level. This fluid collection extends to the skin surface in the midline.  There is a small tract which extends to the deep

## 2024-06-03 NOTE — DISCHARGE SUMMARY
Patient transferring from [facility] to [facility] for continued care.  See H&P.    Jonathan Knox, DO

## 2024-06-03 NOTE — TELEPHONE ENCOUNTER
From: Nidia Thomas  To: Dr. Gallo Max  Sent: 6/3/2024 8:20 AM EDT  Subject: Monday Morning    Keshia,   This is Vasiliy Thomas again. I am so sorry to keep bothering you guys. I promise I am not trying to be a difficult patient. I know that Dr. Max is on vacation. If no one can see me could I please have Rx for pain? Dr. Max wrote me a five day supply last Thursday that I finished this morning for Hydrocodone 10mg. The pain is excruciating and not getting better. I really do not want to go to the  for third time. I don’t know if I can get into the car to come downtown and see you because it is a long drive and I have [place] in one position since Thursday night. I apologize greatly as I know you have patients that are worse off. I have had many surgeries and have never experienced pain like this. Thank you so much. I am very grateful for all of your help. Esperanza

## 2024-06-03 NOTE — H&P
Patient transferring from [facility] to [facility] for continued care.  See [facility] H&P.    Jonathan Knox, DO

## 2024-06-04 ENCOUNTER — ANESTHESIA (OUTPATIENT)
Dept: SURGERY | Age: 49
End: 2024-06-04
Payer: MEDICARE

## 2024-06-04 PROBLEM — G06.1 ABSCESS IN EPIDURAL SPACE OF SPINE: Status: RESOLVED | Noted: 2024-06-04 | Resolved: 2024-06-04

## 2024-06-04 PROBLEM — G06.1 ABSCESS IN EPIDURAL SPACE OF SPINE: Status: ACTIVE | Noted: 2024-06-04

## 2024-06-04 PROBLEM — N95.1 MENOPAUSE SYNDROME: Status: ACTIVE | Noted: 2022-03-07

## 2024-06-04 PROBLEM — M16.11 PRIMARY OSTEOARTHRITIS OF RIGHT HIP: Status: ACTIVE | Noted: 2022-06-07

## 2024-06-04 PROBLEM — Z96.641 PRESENCE OF ARTIFICIAL HIP JOINT, RIGHT: Status: ACTIVE | Noted: 2022-10-25

## 2024-06-04 PROBLEM — F11.20 OPIOID DEPENDENCE (HCC): Status: ACTIVE | Noted: 2024-06-04

## 2024-06-04 PROBLEM — M17.11 ARTHRITIS OF RIGHT KNEE: Status: ACTIVE | Noted: 2021-05-05

## 2024-06-04 PROBLEM — M17.12 ARTHRITIS OF LEFT KNEE: Status: ACTIVE | Noted: 2021-05-05

## 2024-06-04 PROBLEM — H10.45 CHRONIC ALLERGIC CONJUNCTIVITIS: Status: ACTIVE | Noted: 2024-06-04

## 2024-06-04 LAB
ALBUMIN SERPL-MCNC: 2.9 G/DL (ref 3.5–5)
ANION GAP SERPL CALC-SCNC: 10 MMOL/L (ref 9–18)
BASOPHILS # BLD: 0 K/UL (ref 0–0.2)
BASOPHILS NFR BLD: 0 % (ref 0–2)
BUN SERPL-MCNC: 10 MG/DL (ref 6–23)
CALCIUM SERPL-MCNC: 9 MG/DL (ref 8.8–10.2)
CHLORIDE SERPL-SCNC: 104 MMOL/L (ref 98–107)
CO2 SERPL-SCNC: 25 MMOL/L (ref 20–28)
CREAT SERPL-MCNC: 0.52 MG/DL (ref 0.6–1.1)
DIFFERENTIAL METHOD BLD: ABNORMAL
EOSINOPHIL # BLD: 0.2 K/UL (ref 0–0.8)
EOSINOPHIL NFR BLD: 3 % (ref 0.5–7.8)
ERYTHROCYTE [DISTWIDTH] IN BLOOD BY AUTOMATED COUNT: 15.6 % (ref 11.9–14.6)
GLUCOSE SERPL-MCNC: 75 MG/DL (ref 70–99)
HCT VFR BLD AUTO: 35.9 % (ref 35.8–46.3)
HGB BLD-MCNC: 11.4 G/DL (ref 11.7–15.4)
IMM GRANULOCYTES # BLD AUTO: 0 K/UL (ref 0–0.5)
IMM GRANULOCYTES NFR BLD AUTO: 0 % (ref 0–5)
LYMPHOCYTES # BLD: 3.1 K/UL (ref 0.5–4.6)
LYMPHOCYTES NFR BLD: 44 % (ref 13–44)
MAGNESIUM SERPL-MCNC: 2 MG/DL (ref 1.8–2.4)
MCH RBC QN AUTO: 27.4 PG (ref 26.1–32.9)
MCHC RBC AUTO-ENTMCNC: 31.8 G/DL (ref 31.4–35)
MCV RBC AUTO: 86.3 FL (ref 82–102)
MONOCYTES # BLD: 0.5 K/UL (ref 0.1–1.3)
MONOCYTES NFR BLD: 6 % (ref 4–12)
NEUTS SEG # BLD: 3.2 K/UL (ref 1.7–8.2)
NEUTS SEG NFR BLD: 47 % (ref 43–78)
NRBC # BLD: 0 K/UL (ref 0–0.2)
PHOSPHATE SERPL-MCNC: 4.1 MG/DL (ref 2.5–4.5)
PLATELET # BLD AUTO: 381 K/UL (ref 150–450)
PMV BLD AUTO: 8.5 FL (ref 9.4–12.3)
POTASSIUM SERPL-SCNC: 4.7 MMOL/L (ref 3.5–5.1)
RBC # BLD AUTO: 4.16 M/UL (ref 4.05–5.2)
SODIUM SERPL-SCNC: 139 MMOL/L (ref 136–145)
WBC # BLD AUTO: 7 K/UL (ref 4.3–11.1)

## 2024-06-04 PROCEDURE — 2500000003 HC RX 250 WO HCPCS: Performed by: NURSE ANESTHETIST, CERTIFIED REGISTERED

## 2024-06-04 PROCEDURE — 6370000000 HC RX 637 (ALT 250 FOR IP): Performed by: NEUROLOGICAL SURGERY

## 2024-06-04 PROCEDURE — 2580000003 HC RX 258: Performed by: NEUROLOGICAL SURGERY

## 2024-06-04 PROCEDURE — 2720000010 HC SURG SUPPLY STERILE: Performed by: NEUROLOGICAL SURGERY

## 2024-06-04 PROCEDURE — 3700000000 HC ANESTHESIA ATTENDED CARE: Performed by: NEUROLOGICAL SURGERY

## 2024-06-04 PROCEDURE — 2580000003 HC RX 258: Performed by: NURSE ANESTHETIST, CERTIFIED REGISTERED

## 2024-06-04 PROCEDURE — 6370000000 HC RX 637 (ALT 250 FOR IP): Performed by: STUDENT IN AN ORGANIZED HEALTH CARE EDUCATION/TRAINING PROGRAM

## 2024-06-04 PROCEDURE — 6370000000 HC RX 637 (ALT 250 FOR IP): Performed by: HOSPITALIST

## 2024-06-04 PROCEDURE — 0JB70ZZ EXCISION OF BACK SUBCUTANEOUS TISSUE AND FASCIA, OPEN APPROACH: ICD-10-PCS | Performed by: NEUROLOGICAL SURGERY

## 2024-06-04 PROCEDURE — 87176 TISSUE HOMOGENIZATION CULTR: CPT

## 2024-06-04 PROCEDURE — 87077 CULTURE AEROBIC IDENTIFY: CPT

## 2024-06-04 PROCEDURE — 2709999900 HC NON-CHARGEABLE SUPPLY: Performed by: NEUROLOGICAL SURGERY

## 2024-06-04 PROCEDURE — 3600000002 HC SURGERY LEVEL 2 BASE: Performed by: NEUROLOGICAL SURGERY

## 2024-06-04 PROCEDURE — 3600000012 HC SURGERY LEVEL 2 ADDTL 15MIN: Performed by: NEUROLOGICAL SURGERY

## 2024-06-04 PROCEDURE — 22015 I&D ABSCESS P-SPINE L/S/LS: CPT | Performed by: NEUROLOGICAL SURGERY

## 2024-06-04 PROCEDURE — 87102 FUNGUS ISOLATION CULTURE: CPT

## 2024-06-04 PROCEDURE — 3700000001 HC ADD 15 MINUTES (ANESTHESIA): Performed by: NEUROLOGICAL SURGERY

## 2024-06-04 PROCEDURE — 99024 POSTOP FOLLOW-UP VISIT: CPT | Performed by: NURSE PRACTITIONER

## 2024-06-04 PROCEDURE — 6360000002 HC RX W HCPCS: Performed by: NEUROLOGICAL SURGERY

## 2024-06-04 PROCEDURE — 6360000002 HC RX W HCPCS: Performed by: ANESTHESIOLOGY

## 2024-06-04 PROCEDURE — 1100000000 HC RM PRIVATE

## 2024-06-04 PROCEDURE — 36415 COLL VENOUS BLD VENIPUNCTURE: CPT

## 2024-06-04 PROCEDURE — 6360000002 HC RX W HCPCS: Performed by: INTERNAL MEDICINE

## 2024-06-04 PROCEDURE — 7100000000 HC PACU RECOVERY - FIRST 15 MIN: Performed by: NEUROLOGICAL SURGERY

## 2024-06-04 PROCEDURE — 87206 SMEAR FLUORESCENT/ACID STAI: CPT

## 2024-06-04 PROCEDURE — 80069 RENAL FUNCTION PANEL: CPT

## 2024-06-04 PROCEDURE — A4217 STERILE WATER/SALINE, 500 ML: HCPCS | Performed by: NEUROLOGICAL SURGERY

## 2024-06-04 PROCEDURE — 83735 ASSAY OF MAGNESIUM: CPT

## 2024-06-04 PROCEDURE — 87075 CULTR BACTERIA EXCEPT BLOOD: CPT

## 2024-06-04 PROCEDURE — 85025 COMPLETE CBC W/AUTO DIFF WBC: CPT

## 2024-06-04 PROCEDURE — 87070 CULTURE OTHR SPECIMN AEROBIC: CPT

## 2024-06-04 PROCEDURE — 6370000000 HC RX 637 (ALT 250 FOR IP): Performed by: ANESTHESIOLOGY

## 2024-06-04 PROCEDURE — 6360000002 HC RX W HCPCS: Performed by: NURSE ANESTHETIST, CERTIFIED REGISTERED

## 2024-06-04 PROCEDURE — 7100000001 HC PACU RECOVERY - ADDTL 15 MIN: Performed by: NEUROLOGICAL SURGERY

## 2024-06-04 PROCEDURE — 87186 SC STD MICRODIL/AGAR DIL: CPT

## 2024-06-04 PROCEDURE — 2580000003 HC RX 258: Performed by: INTERNAL MEDICINE

## 2024-06-04 PROCEDURE — 6360000002 HC RX W HCPCS: Performed by: STUDENT IN AN ORGANIZED HEALTH CARE EDUCATION/TRAINING PROGRAM

## 2024-06-04 PROCEDURE — 87205 SMEAR GRAM STAIN: CPT

## 2024-06-04 RX ORDER — DEXAMETHASONE SODIUM PHOSPHATE 10 MG/ML
INJECTION INTRAMUSCULAR; INTRAVENOUS PRN
Status: DISCONTINUED | OUTPATIENT
Start: 2024-06-04 | End: 2024-06-04 | Stop reason: SDUPTHER

## 2024-06-04 RX ORDER — HYDROMORPHONE HYDROCHLORIDE 2 MG/ML
0.5 INJECTION, SOLUTION INTRAMUSCULAR; INTRAVENOUS; SUBCUTANEOUS EVERY 5 MIN PRN
Status: DISCONTINUED | OUTPATIENT
Start: 2024-06-04 | End: 2024-06-04 | Stop reason: HOSPADM

## 2024-06-04 RX ORDER — SODIUM CHLORIDE 0.9 % (FLUSH) 0.9 %
5-40 SYRINGE (ML) INJECTION PRN
Status: DISCONTINUED | OUTPATIENT
Start: 2024-06-04 | End: 2024-06-04 | Stop reason: HOSPADM

## 2024-06-04 RX ORDER — SODIUM CHLORIDE, SODIUM LACTATE, POTASSIUM CHLORIDE, CALCIUM CHLORIDE 600; 310; 30; 20 MG/100ML; MG/100ML; MG/100ML; MG/100ML
INJECTION, SOLUTION INTRAVENOUS CONTINUOUS PRN
Status: DISCONTINUED | OUTPATIENT
Start: 2024-06-04 | End: 2024-06-04 | Stop reason: SDUPTHER

## 2024-06-04 RX ORDER — NALOXONE HYDROCHLORIDE 0.4 MG/ML
INJECTION, SOLUTION INTRAMUSCULAR; INTRAVENOUS; SUBCUTANEOUS PRN
Status: DISCONTINUED | OUTPATIENT
Start: 2024-06-04 | End: 2024-06-04 | Stop reason: HOSPADM

## 2024-06-04 RX ORDER — SODIUM CHLORIDE, SODIUM LACTATE, POTASSIUM CHLORIDE, CALCIUM CHLORIDE 600; 310; 30; 20 MG/100ML; MG/100ML; MG/100ML; MG/100ML
INJECTION, SOLUTION INTRAVENOUS CONTINUOUS
Status: DISCONTINUED | OUTPATIENT
Start: 2024-06-04 | End: 2024-06-04 | Stop reason: HOSPADM

## 2024-06-04 RX ORDER — SODIUM CHLORIDE 0.9 % (FLUSH) 0.9 %
5-40 SYRINGE (ML) INJECTION EVERY 12 HOURS SCHEDULED
Status: DISCONTINUED | OUTPATIENT
Start: 2024-06-04 | End: 2024-06-07 | Stop reason: HOSPADM

## 2024-06-04 RX ORDER — MORPHINE SULFATE 2 MG/ML
2 INJECTION, SOLUTION INTRAMUSCULAR; INTRAVENOUS
Status: DISCONTINUED | OUTPATIENT
Start: 2024-06-04 | End: 2024-06-05

## 2024-06-04 RX ORDER — PROPOFOL 10 MG/ML
INJECTION, EMULSION INTRAVENOUS PRN
Status: DISCONTINUED | OUTPATIENT
Start: 2024-06-04 | End: 2024-06-04 | Stop reason: SDUPTHER

## 2024-06-04 RX ORDER — FENTANYL CITRATE 50 UG/ML
INJECTION, SOLUTION INTRAMUSCULAR; INTRAVENOUS PRN
Status: DISCONTINUED | OUTPATIENT
Start: 2024-06-04 | End: 2024-06-04 | Stop reason: SDUPTHER

## 2024-06-04 RX ORDER — ONDANSETRON 2 MG/ML
INJECTION INTRAMUSCULAR; INTRAVENOUS PRN
Status: DISCONTINUED | OUTPATIENT
Start: 2024-06-04 | End: 2024-06-04 | Stop reason: SDUPTHER

## 2024-06-04 RX ORDER — ONDANSETRON 2 MG/ML
4 INJECTION INTRAMUSCULAR; INTRAVENOUS
Status: DISCONTINUED | OUTPATIENT
Start: 2024-06-04 | End: 2024-06-04 | Stop reason: HOSPADM

## 2024-06-04 RX ORDER — HYDROMORPHONE HYDROCHLORIDE 2 MG/ML
INJECTION, SOLUTION INTRAMUSCULAR; INTRAVENOUS; SUBCUTANEOUS PRN
Status: DISCONTINUED | OUTPATIENT
Start: 2024-06-04 | End: 2024-06-04 | Stop reason: SDUPTHER

## 2024-06-04 RX ORDER — SODIUM CHLORIDE 0.9 % (FLUSH) 0.9 %
5-40 SYRINGE (ML) INJECTION PRN
Status: DISCONTINUED | OUTPATIENT
Start: 2024-06-04 | End: 2024-06-07 | Stop reason: HOSPADM

## 2024-06-04 RX ORDER — GLYCOPYRROLATE 0.2 MG/ML
INJECTION INTRAMUSCULAR; INTRAVENOUS PRN
Status: DISCONTINUED | OUTPATIENT
Start: 2024-06-04 | End: 2024-06-04 | Stop reason: SDUPTHER

## 2024-06-04 RX ORDER — NEOSTIGMINE METHYLSULFATE 1 MG/ML
INJECTION, SOLUTION INTRAVENOUS PRN
Status: DISCONTINUED | OUTPATIENT
Start: 2024-06-04 | End: 2024-06-04 | Stop reason: SDUPTHER

## 2024-06-04 RX ORDER — ROCURONIUM BROMIDE 10 MG/ML
INJECTION, SOLUTION INTRAVENOUS PRN
Status: DISCONTINUED | OUTPATIENT
Start: 2024-06-04 | End: 2024-06-04 | Stop reason: SDUPTHER

## 2024-06-04 RX ORDER — LIDOCAINE HYDROCHLORIDE 20 MG/ML
INJECTION, SOLUTION EPIDURAL; INFILTRATION; INTRACAUDAL; PERINEURAL PRN
Status: DISCONTINUED | OUTPATIENT
Start: 2024-06-04 | End: 2024-06-04 | Stop reason: SDUPTHER

## 2024-06-04 RX ORDER — SODIUM CHLORIDE 0.9 % (FLUSH) 0.9 %
5-40 SYRINGE (ML) INJECTION EVERY 12 HOURS SCHEDULED
Status: DISCONTINUED | OUTPATIENT
Start: 2024-06-04 | End: 2024-06-04 | Stop reason: HOSPADM

## 2024-06-04 RX ORDER — KETAMINE HCL IN NACL, ISO-OSM 20 MG/2 ML
SYRINGE (ML) INJECTION PRN
Status: DISCONTINUED | OUTPATIENT
Start: 2024-06-04 | End: 2024-06-04 | Stop reason: SDUPTHER

## 2024-06-04 RX ORDER — MIDAZOLAM HYDROCHLORIDE 1 MG/ML
INJECTION INTRAMUSCULAR; INTRAVENOUS PRN
Status: DISCONTINUED | OUTPATIENT
Start: 2024-06-04 | End: 2024-06-04 | Stop reason: SDUPTHER

## 2024-06-04 RX ORDER — SODIUM CHLORIDE 9 MG/ML
INJECTION, SOLUTION INTRAVENOUS PRN
Status: DISCONTINUED | OUTPATIENT
Start: 2024-06-04 | End: 2024-06-04 | Stop reason: HOSPADM

## 2024-06-04 RX ORDER — CLONAZEPAM 1 MG/1
2 TABLET ORAL NIGHTLY PRN
Status: DISCONTINUED | OUTPATIENT
Start: 2024-06-04 | End: 2024-06-07 | Stop reason: HOSPADM

## 2024-06-04 RX ADMIN — HYDROMORPHONE HYDROCHLORIDE 1 MG: 1 INJECTION, SOLUTION INTRAMUSCULAR; INTRAVENOUS; SUBCUTANEOUS at 02:32

## 2024-06-04 RX ADMIN — FENTANYL CITRATE 25 MCG: 50 INJECTION, SOLUTION INTRAMUSCULAR; INTRAVENOUS at 14:27

## 2024-06-04 RX ADMIN — FENTANYL CITRATE 25 MCG: 50 INJECTION, SOLUTION INTRAMUSCULAR; INTRAVENOUS at 14:08

## 2024-06-04 RX ADMIN — ROPINIROLE HYDROCHLORIDE 1 MG: 0.5 TABLET, FILM COATED ORAL at 20:00

## 2024-06-04 RX ADMIN — SODIUM CHLORIDE, PRESERVATIVE FREE 10 ML: 5 INJECTION INTRAVENOUS at 20:00

## 2024-06-04 RX ADMIN — DEXAMETHASONE SODIUM PHOSPHATE 10 MG: 10 INJECTION INTRAMUSCULAR; INTRAVENOUS at 14:00

## 2024-06-04 RX ADMIN — HYDROMORPHONE HYDROCHLORIDE 1 MG: 2 INJECTION INTRAMUSCULAR; INTRAVENOUS; SUBCUTANEOUS at 14:29

## 2024-06-04 RX ADMIN — MORPHINE SULFATE 2 MG: 2 INJECTION, SOLUTION INTRAMUSCULAR; INTRAVENOUS at 17:53

## 2024-06-04 RX ADMIN — ACETAMINOPHEN 1000 MG: 500 TABLET, FILM COATED ORAL at 12:49

## 2024-06-04 RX ADMIN — MORPHINE SULFATE 2 MG: 2 INJECTION, SOLUTION INTRAMUSCULAR; INTRAVENOUS at 20:00

## 2024-06-04 RX ADMIN — FENTANYL CITRATE 50 MCG: 50 INJECTION, SOLUTION INTRAMUSCULAR; INTRAVENOUS at 13:44

## 2024-06-04 RX ADMIN — CYCLOBENZAPRINE 10 MG: 10 TABLET, FILM COATED ORAL at 20:00

## 2024-06-04 RX ADMIN — Medication 20 MG: at 14:15

## 2024-06-04 RX ADMIN — CYCLOBENZAPRINE 10 MG: 10 TABLET, FILM COATED ORAL at 02:32

## 2024-06-04 RX ADMIN — PANTOPRAZOLE SODIUM 40 MG: 40 TABLET, DELAYED RELEASE ORAL at 05:43

## 2024-06-04 RX ADMIN — PROPOFOL 200 MG: 10 INJECTION, EMULSION INTRAVENOUS at 13:44

## 2024-06-04 RX ADMIN — MIDAZOLAM 2 MG: 1 INJECTION INTRAMUSCULAR; INTRAVENOUS at 13:39

## 2024-06-04 RX ADMIN — BISACODYL 5 MG: 5 TABLET, COATED ORAL at 08:32

## 2024-06-04 RX ADMIN — HYDROMORPHONE HYDROCHLORIDE 0.5 MG: 2 INJECTION INTRAMUSCULAR; INTRAVENOUS; SUBCUTANEOUS at 15:08

## 2024-06-04 RX ADMIN — CEFEPIME 2000 MG: 2 INJECTION, POWDER, FOR SOLUTION INTRAVENOUS at 05:42

## 2024-06-04 RX ADMIN — ESTRADIOL 1 MG: 1 TABLET ORAL at 08:32

## 2024-06-04 RX ADMIN — HYDROMORPHONE HYDROCHLORIDE 1 MG: 1 INJECTION, SOLUTION INTRAMUSCULAR; INTRAVENOUS; SUBCUTANEOUS at 05:27

## 2024-06-04 RX ADMIN — GLYCOPYRROLATE 0.7 MG: 0.2 INJECTION INTRAMUSCULAR; INTRAVENOUS at 14:19

## 2024-06-04 RX ADMIN — Medication 5 MG: at 14:19

## 2024-06-04 RX ADMIN — ROCURONIUM BROMIDE 40 MG: 10 INJECTION, SOLUTION INTRAVENOUS at 13:44

## 2024-06-04 RX ADMIN — ONDANSETRON 4 MG: 2 INJECTION INTRAMUSCULAR; INTRAVENOUS at 14:00

## 2024-06-04 RX ADMIN — LIDOCAINE HYDROCHLORIDE 80 MG: 20 INJECTION, SOLUTION EPIDURAL; INFILTRATION; INTRACAUDAL; PERINEURAL at 13:44

## 2024-06-04 RX ADMIN — SODIUM CHLORIDE, SODIUM LACTATE, POTASSIUM CHLORIDE, AND CALCIUM CHLORIDE: 600; 310; 30; 20 INJECTION, SOLUTION INTRAVENOUS at 13:36

## 2024-06-04 RX ADMIN — CEFEPIME 2000 MG: 2 INJECTION, POWDER, FOR SOLUTION INTRAVENOUS at 13:36

## 2024-06-04 RX ADMIN — VANCOMYCIN HYDROCHLORIDE 1500 MG: 10 INJECTION, POWDER, LYOPHILIZED, FOR SOLUTION INTRAVENOUS at 05:42

## 2024-06-04 RX ADMIN — CEFEPIME 2000 MG: 2 INJECTION, POWDER, FOR SOLUTION INTRAVENOUS at 20:05

## 2024-06-04 RX ADMIN — MORPHINE SULFATE 2 MG: 2 INJECTION, SOLUTION INTRAMUSCULAR; INTRAVENOUS at 23:52

## 2024-06-04 RX ADMIN — HYDROMORPHONE HYDROCHLORIDE 1 MG: 1 INJECTION, SOLUTION INTRAMUSCULAR; INTRAVENOUS; SUBCUTANEOUS at 08:32

## 2024-06-04 RX ADMIN — MORPHINE SULFATE 2 MG: 2 INJECTION, SOLUTION INTRAMUSCULAR; INTRAVENOUS at 21:48

## 2024-06-04 ASSESSMENT — PAIN DESCRIPTION - PAIN TYPE
TYPE: ACUTE PAIN;CHRONIC PAIN
TYPE: ACUTE PAIN;CHRONIC PAIN;SURGICAL PAIN
TYPE: ACUTE PAIN;CHRONIC PAIN;SURGICAL PAIN
TYPE: ACUTE PAIN;CHRONIC PAIN
TYPE: ACUTE PAIN;CHRONIC PAIN;SURGICAL PAIN

## 2024-06-04 ASSESSMENT — PAIN DESCRIPTION - ONSET
ONSET: ON-GOING

## 2024-06-04 ASSESSMENT — PAIN - FUNCTIONAL ASSESSMENT
PAIN_FUNCTIONAL_ASSESSMENT: PREVENTS OR INTERFERES SOME ACTIVE ACTIVITIES AND ADLS
PAIN_FUNCTIONAL_ASSESSMENT: 0-10
PAIN_FUNCTIONAL_ASSESSMENT: PREVENTS OR INTERFERES SOME ACTIVE ACTIVITIES AND ADLS

## 2024-06-04 ASSESSMENT — PAIN DESCRIPTION - ORIENTATION
ORIENTATION: MID;LOWER;POSTERIOR
ORIENTATION: LOWER;MID;POSTERIOR
ORIENTATION: POSTERIOR;LOWER;MID
ORIENTATION: MID;LOWER;POSTERIOR
ORIENTATION: LOWER;MID;POSTERIOR
ORIENTATION: LOWER;MID;POSTERIOR
ORIENTATION: LOWER;POSTERIOR
ORIENTATION: POSTERIOR;LOWER

## 2024-06-04 ASSESSMENT — PAIN SCALES - GENERAL
PAINLEVEL_OUTOF10: 0
PAINLEVEL_OUTOF10: 9
PAINLEVEL_OUTOF10: 0
PAINLEVEL_OUTOF10: 10
PAINLEVEL_OUTOF10: 10
PAINLEVEL_OUTOF10: 9
PAINLEVEL_OUTOF10: 8
PAINLEVEL_OUTOF10: 10
PAINLEVEL_OUTOF10: 0
PAINLEVEL_OUTOF10: 10
PAINLEVEL_OUTOF10: 8
PAINLEVEL_OUTOF10: 0
PAINLEVEL_OUTOF10: 0
PAINLEVEL_OUTOF10: 10

## 2024-06-04 ASSESSMENT — PAIN DESCRIPTION - DESCRIPTORS
DESCRIPTORS: SHARP
DESCRIPTORS: ACHING;SHARP;THROBBING
DESCRIPTORS: BURNING;THROBBING;STABBING
DESCRIPTORS: SHARP;SPASM
DESCRIPTORS: SHARP
DESCRIPTORS: STABBING;SHARP;THROBBING
DESCRIPTORS: SHARP;SORE
DESCRIPTORS: SHARP
DESCRIPTORS: BURNING;THROBBING
DESCRIPTORS: THROBBING;ACHING;STABBING

## 2024-06-04 ASSESSMENT — PAIN DESCRIPTION - LOCATION
LOCATION: BACK

## 2024-06-04 ASSESSMENT — PAIN DESCRIPTION - FREQUENCY
FREQUENCY: CONTINUOUS

## 2024-06-04 ASSESSMENT — LIFESTYLE VARIABLES: SMOKING_STATUS: 0

## 2024-06-04 NOTE — PROGRESS NOTES
VANCO DAILY FOLLOW UP NOTE  [facility]   Pharmacy Pharmacokinetic Monitoring Service - Vancomycin    Consulting Provider: Dr. Knox   Indication: Surgical site infection  Target Concentration: Goal AUC/EYAD 400-600 mg*hr/L  Day of Therapy: 2  Additional Antimicrobials: cefepime    Pertinent Laboratory Values:   Wt Readings from Last 1 Encounters:   06/03/24 86.5 kg (190 lb 11 oz)     Temp Readings from Last 1 Encounters:   06/03/24 98.3 °F (36.8 °C) (Oral)     Recent Labs     06/01/24  1856 06/03/24  1311 06/04/24  0201   BUN 14 12 10   CREATININE 0.54* 0.56* 0.52*   WBC 8.9 6.5 7.0   PROCAL  --  0.05  --    LACTA  --  0.7  --      Estimated Creatinine Clearance: 146 mL/min (A) (based on SCr of 0.52 mg/dL (L)).    No results found for: \"VANCOTROUGH\", \"VANCORANDOM\"    MRSA Nasal Swab: N/A. Non-respiratory infection    Assessment:  Date/Time Dose Concentration AUC         Note: Serum concentrations collected for AUC dosing may appear elevated if collected in close proximity to the dose administered, this is not necessarily an indication of toxicity    Plan:  Dosing recommendations based on Bayesian software  Continue vancomycin 1500 mg every 12 hours  Renal labs as indicated   Vancomycin concentration ordered for 6/5 @ 0400  Pharmacy will continue to monitor patient and adjust therapy as indicated    Thank you for the consult,  Radha Nuno RPH

## 2024-06-04 NOTE — ANESTHESIA PRE PROCEDURE
Department of Anesthesiology  Preprocedure Note       Name:  Nidia Thomas   Age:  48 y.o.  :  1975                                          MRN:  [id number]         Date:  2024      Surgeon: Surgeon(s):  Tao Correa MD    Procedure: Procedure(s):  Wound irrigation and debridment    Medications prior to admission:   Prior to Admission medications    Medication Sig Start Date End Date Taking? Authorizing Provider   clonazePAM (KLONOPIN) 2 MG tablet Take 1 tablet by mouth nightly as needed for Anxiety.    Elmo Hurst MD   dexAMETHasone (DECADRON) 6 MG tablet Take 1 tablet by mouth daily (with breakfast) for 5 days 24  Demond Summers III, MD   cyclobenzaprine (FLEXERIL) 10 MG tablet Take 1 tablet by mouth 3 times daily as needed for Muscle spasms 24  Gallo Max MD   HYDROcodone-acetaminophen (NORCO)  MG per tablet Take 1 tablet by mouth every 8 hours as needed for Pain for up to 5 days. Intended supply: 30 days Max Daily Amount: 3 tablets 24  Gallo Max MD   ondansetron (ZOFRAN-ODT) 4 MG disintegrating tablet Take 1 tablet by mouth 3 times daily as needed for Nausea or Vomiting 24   Gallo Max MD   amoxicillin-clavulanate (AUGMENTIN) 875-125 MG per tablet Take 1 tablet by mouth 2 times daily    Elmo Hurst MD   ALPRAZolam (XANAX) 1 MG tablet Take 1 tablet by mouth 2 times daily as needed for Sleep.    Elmo Hurst MD   estradiol (ESTRACE) 1 MG tablet Take 1 tablet by mouth daily    Elmo Hurst MD   zolpidem (AMBIEN CR) 12.5 MG extended release tablet Take 1 tablet by mouth nightly.    Elmo Hurst MD   Multiple Vitamins-Minerals (CENTRUM SILVER ADULT 50+) TABS Take 1 tablet by mouth daily    Elmo Hurst MD   NONFORMULARY Take 4 tablets by mouth daily Nutrafol    Elmo Hurst MD   rOPINIRole (REQUIP) 1 MG tablet Take 1 tablet by mouth nightly 3/19/24   Aris

## 2024-06-04 NOTE — PERIOP NOTE
TRANSFER - OUT REPORT:    Verbal report given to Jd SCHULTZ on Nidia Thomas  being transferred back to [facility] for routine progression of patient care       Report consisted of patient’s Situation, Background, Assessment and   Recommendations(SBAR).     Information from the following report(s) Nurse Handoff Report, Adult Overview, Surgery Report, Intake/Output, MAR, Recent Results, Cardiac Rhythm Normal Sinus, Procedure Verification, Quality Measures, and Neuro Assessment was reviewed with the receiving nurse.    Lines:   Peripheral IV 06/03/24 Posterior;Right Wrist (Active)   Site Assessment Clean, dry & intact 06/04/24 1445   Line Status Flushed;Normal saline locked;Capped 06/04/24 1445   Line Care Cap changed 06/04/24 1445   Phlebitis Assessment No symptoms 06/04/24 1445   Infiltration Assessment 0 06/04/24 1445   Alcohol Cap Used Yes 06/03/24 2034   Dressing Status Clean, dry & intact 06/04/24 1445   Dressing Type Transparent 06/04/24 1445       Peripheral IV 06/04/24 (Active)   Site Assessment Clean, dry & intact 06/04/24 1445   Line Status Flushed;Infusing 06/04/24 1445   Line Care Connections checked and tightened 06/04/24 1445   Phlebitis Assessment No symptoms 06/04/24 1445   Infiltration Assessment 0 06/04/24 1445   Dressing Status Clean, dry & intact 06/04/24 1445   Dressing Type Transparent 06/04/24 1445        Opportunity for questions and clarification was provided.      Patient transported with:   Tech    VTE prophylaxis orders have been written for Nidia Thomas.    Patient and family given floor number and nurses name.  Family updated re: pt status after security code verified.

## 2024-06-04 NOTE — ANESTHESIA POSTPROCEDURE EVALUATION
Department of Anesthesiology  Postprocedure Note    Patient: Nidia Thomas  MRN: [id number]  YOB: 1975  Date of evaluation: 6/4/2024    Procedure Summary       Date: 06/04/24 Room / Location: [facility] MAIN OR 02 / [facility] MAIN OR    Anesthesia Start: 1336 Anesthesia Stop: 1450    Procedure: Wound irrigation and debridment (Back) Diagnosis:       Lumbar radiculopathy      (Lumbar radiculopathy [M54.16])    Providers: Tao Correa MD Responsible Provider: Elpidio Yen MD    Anesthesia Type: General ASA Status: 2            Anesthesia Type: General    Dania Phase I: Dania Score: 10    Dania Phase II:      Anesthesia Post Evaluation    Patient location during evaluation: PACU  Patient participation: complete - patient participated  Level of consciousness: awake and alert  Airway patency: patent  Nausea & Vomiting: no nausea and no vomiting  Cardiovascular status: hemodynamically stable  Respiratory status: acceptable, nonlabored ventilation and spontaneous ventilation  Hydration status: euvolemic  Comments: BP (!) 141/85   Pulse 71   Temp 97.8 °F (36.6 °C) (Temporal)   Resp 14   Ht 1.702 m (5' 7\")   Wt 86.5 kg (190 lb 11 oz)   SpO2 96%   BMI 29.87 kg/m²     Multimodal analgesia pain management approach  Pain management: adequate and satisfactory to patient        No notable events documented.   [] [address]  [address]  [phone]  F: [phone] [x] [address]  [facility]   [address]  P: [phone]  F: [phone] [] [address]  [address]  P: [phone]  F: [phone] [] [address]  P: [phone]  F: [phone] [] [address]  [facility]   [address]   [place]: [phone]  F: [phone]      Physical Therapy Daily Treatment Note    Date:  2023  Patient Name:  Sher Capone    :  1954  MRN: [id number]  Physician: Edmund Delgado MD                           Insurance: Medicare  Medical Diagnosis:   M25.551 (ICD-10-CM) - Right hip pain   M54.50, G89.29 (ICD-10-CM) - Chronic right-sided low back pain, unspecified whether sciatica present   G57.01 (ICD-10-CM) - Piriformis syndrome of right side   Rehab Codes: M54.31, M79.1, M79.651, R26.89  Onset date: 22             Next 's appt.: TBD    Visit# / total visits: ; Progress note for Medicare patient due at visit 10     Cancels/No Shows: 0/0    Subjective:    Pain:  [x] Yes  [] No Location: R hip  Pain Rating: (0-10 scale) 10/10  Pain altered Tx:  [] No  [x] Yes  Action: poor tolerance to attempts at exercises, limiting completed exercises. Held manual and used HP per pt request    Comments: Pt arrives with elevated pain that has not been reduced with pain meds. Pt does report that she walked over 2.2 miles yesterday. Pt sees her ortho doc, Dr. Terri Muñoz on .      Objective:  Modalities: HP to R hip in supine at end of session x 10 min  Precautions: hard of hearing  Exercises:  Exercise Reps/ Time Weight/ Level Comments   Nustep 10 min  Lv 2 Increased time as pain was reduced during warm up 2/23         Supine         HS stretch- R only 3x 30\" Minimal range tolerated only   Bent knee fall outs 5 ea   Decreased range of motion on right compared to left due to pain; minimal cues to decrease pelvic rotation   Hooklying glut sets 10x 2s       Heel slides 2x10     SAQ 15x  Inc pain in knee. Hip add 15x [facility] 10x2   Minimal cues for gluteal contraction prior to bridge   Pelvic tilts x   Did not provided as HEP due to increased upper abdominal activation with exercise         Seated   New 2/20   Sciatic nerve glides 10x 5\" Added 2/23   Piriformis stretch 1x 30\" Figure 4- only 1 rep d/t poor tolerance. Glute sets  20x5\"     LAQ 20x A    Hip add 20x ball    Hip abd 20x lime    HS stretch  3x30\" Stool     March  20x ea A    HS curls 20x Lime     Paloff press 20x ea [place]    STS 2x10  22.5 in          Standing      Slant board calf stretch 3x 30\" First 2 reps done unilaterally for improved tolerance    Heel raises 15x     march X   Inc pain. Manual 11 min total  Lateral glides to hip with mobilization belt  Inferior glides to hip with mobilization belt  Hypervolt lv 3 cushion head to all global hip musculature in L sidelying with pillow between legs. Other:               Treatment Charges: Mins Units   [x]  Modalities - HP  10 -   [x]  Ther Exercise 35 2   []  Manual Therapy     []  Ther Activities     []  Aquatics     []  Vasocompression     []  Other     Total Treatment time 35 2   Estimated Medicare Cost to date: as of 02/23/23 $726.22    Assessment: [] Progressing toward goals. [] No change. [x] Other:  Pt enters with c/o high pain, ambulating with a cane, demonstrating antalgic gait pattern. Started with warm up on Nustep, increasing time as pt reports reduced pain during warm up. After warm up pt rates pain 3/10. Attempted calf stretch on slant board however pt was unable to tolerate bilaterally, therefore stretch was completed unilaterally for first 2 reps.  By the 3rd rep pt was able to stretch both calves at the same time. Attempted supine for stretching and exercises. Pt only tolerated supine during R sided HS stretch and then could no longer stay in this position. HS stretch in supine was performed in limited ROM secondary to pain. Added sciatic nerve glides and attempted piriformis stretch in seated. Good tolerance to never glides, but poor tolerance to piriformis stretch. With exercises pt reports pain levels returned to elevated level of nearly 10/10. Held further completion of exercises at this time. Pt denies manual as she felt this to agitate her pain last session and requested to try HP. HP was applied in supine as this position was best tolerated at this time. The option for aquatics was discussed with pt. Pt is scheduled next week and then sees her ortho doctor the following week. [x] Patient would continue to benefit from skilled physical therapy services in order to: improve overall core and pelvic/glut strength in order to stabilize the lumbar spine and decrease pelvic rotation in order to decrease overall pain and improve functional mobility. Problem list, as detailed above:   [x] ? Pain: Right hip, radiating to the LE                       [x] ? ROM: Lumbar flexion ROM, right hip AROM (limited due to pain)                      [x] ? Strength: core/pelvis         [x] ? Function: TRAM/LEFTS  [x] Gait Deviations: Antalgic gait        STG: (to be met in 10 treatments)  ? Pain: Patient will report no greater than 6-7/10 pain with daily activity to demonstrate improved quality of life. ? ROM: Patient will increase lumbar range of motion to 100% to demonstrate improved flexibility. ? Strength: Patient will increased hip/glut strength to 4/5 to improve overall lumbar stability. ? Function: Patient will report ability to sit for >1 hour without an increase in low back/right hip pain.   Patient to be independent with home exercise program as demonstrated by performance with correct form without cues. Demonstrate Knowledge of fall prevention - MET, provided 2/16    LTG: (to be met in 20 treatments)  ? Pain: Patient will report no greater than 4-5/10 pain with daily activity to demonstrate improved quality of life. ? ROM: Patient will have negative right SLR to demonstrate improved quality of life and centralization of symptoms  ? Function: Patient will increase TRAM by 6 points to demonstrate improved quality of life. ? Function: Patient will improve LEFTS score by 9 points to demonstrate improved quality of life. Patient goals: \"To improve my pain\"    Pt. Education:  [x] Yes  [] No  [x] Reviewed Prior HEP/Ed  Method of Education: [x] Verbal- discussed aquatics   [] Demo  [] Written  Access Code: [email or web address]  URL: [email or web address]  Date: 02/13/2023  Prepared by: Amirah Rise Knee Fallouts - 1 x daily - 1 sets - 10 reps  Hooklying Gluteal Sets - 1 x daily - 2 sets - 10 reps  Supine Bridge - 1 x daily - 2 sets - 10 reps  Seated Correct Posture    Date: 02/20/2023  Prepared by: [address] [place] [address],8W - 1 x daily - 7 x weekly - 2 sets - 10 reps  Seated Hamstring Stretch - 1 x daily - 7 x weekly - 2 sets - 10 reps  Seated Hip Abduction with Resistance - 1 x daily - 7 x weekly - 2 sets - 10 reps  Seated Hip Adduction Isometrics with Ball - 1 x daily - 7 x weekly - 2 sets - 10 reps  Seated Gluteal Sets - 1 x daily - 7 x weekly - 2 sets - 10 reps      Comprehension of Education:  [] Verbalizes understanding. [] Demonstrates understanding. [] Needs review. [x] Demonstrates/verbalizes HEP/Ed previously given. Plan: [x] Continue current frequency toward long and short term goals. [x] Specific Instructions for subsequent treatments: monitor response to treatment, progress as able.  Aquatics if tolerance to land does not improve      Time In: 2:01pm            Time Out: 2:56pm    Electronically signed by:  Marcus Evans PTA

## 2024-06-04 NOTE — CONSULTS
NEUROSURGERY CONSULT NOTE:     CC: post operative wound infection    Assessment and Plan:   -NPO  -plan for wound washout with Dr. Correa    HPI:   Nidia Thomas48 y.o. female presents to ER with complaint of purulent wound drainage that started today.  Pt underwent a left L3-4 metrix decompression  with Dr. Max on 5/8/24.  Pt had an unremarkable wound check and has had both a new lumbar MRI and lumbar CT scan that did not reveal any evidence of infection.  Pt has called several times in the post operative setting reporting many falls in which she landing on the surgical site.  No hardware instability or abnormality or fracture has been noted.  Pt does not have an elevated WBC, no elevated lactic acid, no fever, or body chills  indicating infection.  ER provider states purulent drainage can be squeezed from surgical incision site.  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  Blood cultures were sent in the ER.  Will draw a ESR and C reactive protein. Pt already given 1 dose of zosyn while in the ER. Have NPO after midnight and plan to take patient to OR tomorrow afternoon for a wound washout and wound vac placement.  Pt is made aware that based off lumbar MRI she has no compressive etiology that explains worsening pain and that if her pain is related to the infection the washout should relieve this pain but if it is related to a different origin than infection surgery will not be to alleviate her pain.   Past Medical History:   Diagnosis Date    Achilles tendon sprain     right- resolved    Anxiety and depression     BMI 26.0-26.9,adult     BMI 26    BRCA negative     Endometriosis     Alma Barr virus positive mononucleosis syndrome     12-11    GERD (gastroesophageal reflux disease)     H/O gastric sleeve     History of anal fissures 01/20/2015    History of right hemicolectomy     History of seizure     3 total- last seizure 2/2018-- believer r/t a medication    Insomnia     Iron  Assessment:    RADHA  Alert and Oriented x4   GCS15    Speech clear  Reflexes 2+ equal and bilateral  Cranial nerves I-VII grossly intact  Sensation equal bilaterally  Pronator Drift negative  Bowel and bladder control intact  Upper extremities: intact  Lower extremities: intact    Yumiko Cespedes, RAMONA - CNP

## 2024-06-04 NOTE — OP NOTE
Operative Note      Patient: Nidia Thomas  YOB: 1975  MRN: [id number]    Date of Procedure: 6/4/2024    Pre-Op Diagnosis Codes:     * Lumbar radiculopathy [M54.16]    Draining surgical wound      Post-Op Diagnosis: Same       Procedure(s):  Wound irrigation and debridment    Surgeon(s):  Tao Correa MD    Assistant:   * No surgical staff found *    Anesthesia: General    Estimated Blood Loss (mL): Minimal    Complications: None    Specimens:   ID Type Source Tests Collected by Time Destination   1 : surgical back wound Swab Back CULTURE, FUNGUS, CULTURE, WOUND Tao Correa MD 6/4/2024 1408    2 : back fluid Tissue Back CULTURE, FUNGUS, CULTURE, TISSUE Tao Correa MD 6/4/2024 1413        Implants:  * No implants in log *      Drains:   Closed/Suction Drain Medial Back (Active)   Dressing Status Clean, dry & intact 06/04/24 1445       Findings:  Infection Present At Time Of Surgery (PATOS) (choose all levels that have infection present):  - Superficial Infection (skin/subcutaneous) present as evidenced by pus and purulent fluid  Other Findings: Fascia intact.    Detailed Description of Procedure:   After adequate general endotracheal anesthesia was obtained, the patient was rolled in the prone position on chest rolls.  All pressure points were carefully padded.  The patient was on broad-spectrum antibiotics and additional perioperative antibiotics were not administered.  The back was prepped and draped in standard sterile fashion.  A timeout was performed and all parties were in agreement.  Her old surgical wound was opened bluntly and her sutures were cut.  The wound was carefully inspected under loupe magnification and the wound itself appeared to be intact with no superficial trauma.  There was a very small opening at the top of the wound where the fluid was emanating from.  Once the wound was opened a Weitlaner retractor was placed and culture swabs

## 2024-06-04 NOTE — PROGRESS NOTES
06/03/24  1:11 PM    Specimen: Blood   Result Value Ref Range    Special Requests LEFT  Antecubital        Culture NO GROWTH AFTER 17 HOURS     Lactic Acid    Collection Time: 06/03/24  1:11 PM   Result Value Ref Range    Lactic Acid 0.7 0.5 - 2.0 mmol/L   Procalcitonin    Collection Time: 06/03/24  1:11 PM   Result Value Ref Range    Procalcitonin 0.05 0.00 - 0.10 ng/mL   Sedimentation Rate    Collection Time: 06/03/24  1:11 PM   Result Value Ref Range    Sed Rate, Automated 36 (H) 0 - 20 mm/hr   Culture, Blood 1    Collection Time: 06/03/24  2:08 PM    Specimen: Blood   Result Value Ref Range    Special Requests RIGHT  HAND        Culture NO GROWTH AFTER 17 HOURS     Pregnancy, HCG qualitative serum    Collection Time: 06/03/24  8:57 PM   Result Value Ref Range    Preg, Serum Negative NEG     Renal Function Panel    Collection Time: 06/04/24  2:01 AM   Result Value Ref Range    Sodium 139 136 - 145 mmol/L    Potassium 4.7 3.5 - 5.1 mmol/L    Chloride 104 98 - 107 mmol/L    CO2 25 20 - 28 mmol/L    Anion Gap 10 9 - 18 mmol/L    Glucose 75 70 - 99 mg/dL    BUN 10 6 - 23 MG/DL    Creatinine 0.52 (L) 0.60 - 1.10 MG/DL    Est, Glom Filt Rate >90 >60 ml/min/1.73m2    Calcium 9.0 8.8 - 10.2 MG/DL    Phosphorus 4.1 2.5 - 4.5 MG/DL    Albumin 2.9 (L) 3.5 - 5.0 g/dL   CBC with Auto Differential    Collection Time: 06/04/24  2:01 AM   Result Value Ref Range    WBC 7.0 4.3 - 11.1 K/uL    RBC 4.16 4.05 - 5.2 M/uL    Hemoglobin 11.4 (L) 11.7 - 15.4 g/dL    Hematocrit 35.9 35.8 - 46.3 %    MCV 86.3 82 - 102 FL    MCH 27.4 26.1 - 32.9 PG    MCHC 31.8 31.4 - 35.0 g/dL    RDW 15.6 (H) 11.9 - 14.6 %    Platelets 381 150 - 450 K/uL    MPV 8.5 (L) 9.4 - 12.3 FL    nRBC 0.00 0.0 - 0.2 K/uL    Differential Type AUTOMATED      Neutrophils % 47 43 - 78 %    Lymphocytes % 44 13 - 44 %    Monocytes % 6 4.0 - 12.0 %    Eosinophils % 3 0.5 - 7.8 %    Basophils % 0 0.0 - 2.0 %    Immature Granulocytes % 0 0.0 - 5.0 %    Neutrophils Absolute  3.2 1.7 - 8.2 K/UL    Lymphocytes Absolute 3.1 0.5 - 4.6 K/UL    Monocytes Absolute 0.5 0.1 - 1.3 K/UL    Eosinophils Absolute 0.2 0.0 - 0.8 K/UL    Basophils Absolute 0.0 0.0 - 0.2 K/UL    Immature Granulocytes Absolute 0.0 0.0 - 0.5 K/UL   Magnesium    Collection Time: 06/04/24  2:01 AM   Result Value Ref Range    Magnesium 2.0 1.8 - 2.4 mg/dL       No results for input(s): \"COVID19\" in the last 72 hours.    Current Meds:  Current Facility-Administered Medications   Medication Dose Route Frequency    0.9 % sodium chloride infusion   IntraVENous PRN    potassium chloride (KLOR-CON M) extended release tablet 40 mEq  40 mEq Oral PRN    Or    potassium bicarb-citric acid (EFFER-K) effervescent tablet 40 mEq  40 mEq Oral PRN    Or    potassium chloride 10 mEq/100 mL IVPB (Peripheral Line)  10 mEq IntraVENous PRN    magnesium sulfate 2000 mg in 50 mL IVPB premix  2,000 mg IntraVENous PRN    ondansetron (ZOFRAN-ODT) disintegrating tablet 4 mg  4 mg Oral Q8H PRN    Or    ondansetron (ZOFRAN) injection 4 mg  4 mg IntraVENous Q6H PRN    polyethylene glycol (GLYCOLAX) packet 17 g  17 g Oral Daily PRN    acetaminophen (TYLENOL) tablet 650 mg  650 mg Oral Q6H PRN    Or    acetaminophen (TYLENOL) suppository 650 mg  650 mg Rectal Q6H PRN    HYDROmorphone HCl PF (DILAUDID) injection 1 mg  1 mg IntraVENous Q3H PRN    HYDROmorphone HCl PF (DILAUDID) injection 0.5 mg  0.5 mg IntraVENous Q3H PRN    bisacodyl (DULCOLAX) EC tablet 5 mg  5 mg Oral Daily    estradiol (ESTRACE) tablet 1 mg  1 mg Oral Daily    pantoprazole (PROTONIX) tablet 40 mg  40 mg Oral QAM AC    rOPINIRole (REQUIP) tablet 1 mg  1 mg Oral Nightly    ALPRAZolam (XANAX) tablet 1 mg  1 mg Oral BID PRN    zolpidem (AMBIEN) tablet 5 mg  5 mg Oral Nightly PRN    ceFEPIme (MAXIPIME) 2,000 mg in sodium chloride 0.9 % 100 mL IVPB (mini-bag)  2,000 mg IntraVENous Q8H    vancomycin (VANCOCIN) 1500 mg in sodium chloride 0.9 % 250 mL IVPB  1,500 mg IntraVENous Q12H    naloxone

## 2024-06-05 DIAGNOSIS — M51.26 LUMBAR DISC HERNIATION: ICD-10-CM

## 2024-06-05 DIAGNOSIS — M54.16 LUMBAR RADICULOPATHY: Primary | ICD-10-CM

## 2024-06-05 DIAGNOSIS — Z98.1 HISTORY OF LUMBAR FUSION: ICD-10-CM

## 2024-06-05 DIAGNOSIS — M25.551 HIP PAIN, ACUTE, RIGHT: ICD-10-CM

## 2024-06-05 DIAGNOSIS — T81.49XA POSTOPERATIVE WOUND ABSCESS: ICD-10-CM

## 2024-06-05 LAB
ANION GAP SERPL CALC-SCNC: 10 MMOL/L (ref 9–18)
BACTERIA SPEC CULT: ABNORMAL
BASOPHILS # BLD: 0 K/UL (ref 0–0.2)
BASOPHILS NFR BLD: 0 % (ref 0–2)
BUN SERPL-MCNC: 11 MG/DL (ref 6–23)
CALCIUM SERPL-MCNC: 9.2 MG/DL (ref 8.8–10.2)
CHLORIDE SERPL-SCNC: 101 MMOL/L (ref 98–107)
CO2 SERPL-SCNC: 29 MMOL/L (ref 20–28)
CREAT SERPL-MCNC: 0.5 MG/DL (ref 0.6–1.1)
CRP SERPL-MCNC: 33 MG/L (ref 0–10)
DIFFERENTIAL METHOD BLD: ABNORMAL
EOSINOPHIL # BLD: 0 K/UL (ref 0–0.8)
EOSINOPHIL NFR BLD: 0 % (ref 0.5–7.8)
ERYTHROCYTE [DISTWIDTH] IN BLOOD BY AUTOMATED COUNT: 14.7 % (ref 11.9–14.6)
GLUCOSE SERPL-MCNC: 104 MG/DL (ref 70–99)
GRAM STN SPEC: ABNORMAL
GRAM STN SPEC: ABNORMAL
HCT VFR BLD AUTO: 33.2 % (ref 35.8–46.3)
HGB BLD-MCNC: 10.7 G/DL (ref 11.7–15.4)
IMM GRANULOCYTES # BLD AUTO: 0 K/UL (ref 0–0.5)
IMM GRANULOCYTES NFR BLD AUTO: 1 % (ref 0–5)
LYMPHOCYTES # BLD: 1.5 K/UL (ref 0.5–4.6)
LYMPHOCYTES NFR BLD: 22 % (ref 13–44)
MCH RBC QN AUTO: 27.4 PG (ref 26.1–32.9)
MCHC RBC AUTO-ENTMCNC: 32.2 G/DL (ref 31.4–35)
MCV RBC AUTO: 85.1 FL (ref 82–102)
MONOCYTES # BLD: 0.4 K/UL (ref 0.1–1.3)
MONOCYTES NFR BLD: 7 % (ref 4–12)
NEUTS SEG # BLD: 4.8 K/UL (ref 1.7–8.2)
NEUTS SEG NFR BLD: 70 % (ref 43–78)
NRBC # BLD: 0 K/UL (ref 0–0.2)
PLATELET # BLD AUTO: 377 K/UL (ref 150–450)
PMV BLD AUTO: 9 FL (ref 9.4–12.3)
POTASSIUM SERPL-SCNC: 3.9 MMOL/L (ref 3.5–5.1)
RBC # BLD AUTO: 3.9 M/UL (ref 4.05–5.2)
SERVICE CMNT-IMP: ABNORMAL
SODIUM SERPL-SCNC: 139 MMOL/L (ref 136–145)
WBC # BLD AUTO: 6.7 K/UL (ref 4.3–11.1)

## 2024-06-05 PROCEDURE — 2580000003 HC RX 258: Performed by: NEUROLOGICAL SURGERY

## 2024-06-05 PROCEDURE — 94761 N-INVAS EAR/PLS OXIMETRY MLT: CPT

## 2024-06-05 PROCEDURE — 36415 COLL VENOUS BLD VENIPUNCTURE: CPT

## 2024-06-05 PROCEDURE — 80048 BASIC METABOLIC PNL TOTAL CA: CPT

## 2024-06-05 PROCEDURE — 6370000000 HC RX 637 (ALT 250 FOR IP): Performed by: HOSPITALIST

## 2024-06-05 PROCEDURE — 6370000000 HC RX 637 (ALT 250 FOR IP): Performed by: NEUROLOGICAL SURGERY

## 2024-06-05 PROCEDURE — 6360000002 HC RX W HCPCS: Performed by: NEUROLOGICAL SURGERY

## 2024-06-05 PROCEDURE — 97165 OT EVAL LOW COMPLEX 30 MIN: CPT

## 2024-06-05 PROCEDURE — 97530 THERAPEUTIC ACTIVITIES: CPT

## 2024-06-05 PROCEDURE — 6360000002 HC RX W HCPCS: Performed by: HOSPITALIST

## 2024-06-05 PROCEDURE — 99024 POSTOP FOLLOW-UP VISIT: CPT | Performed by: NURSE PRACTITIONER

## 2024-06-05 PROCEDURE — 1100000000 HC RM PRIVATE

## 2024-06-05 PROCEDURE — 85025 COMPLETE CBC W/AUTO DIFF WBC: CPT

## 2024-06-05 PROCEDURE — 97161 PT EVAL LOW COMPLEX 20 MIN: CPT

## 2024-06-05 PROCEDURE — 97535 SELF CARE MNGMENT TRAINING: CPT

## 2024-06-05 RX ORDER — MORPHINE SULFATE 2 MG/ML
2 INJECTION, SOLUTION INTRAMUSCULAR; INTRAVENOUS
Status: DISCONTINUED | OUTPATIENT
Start: 2024-06-05 | End: 2024-06-07 | Stop reason: HOSPADM

## 2024-06-05 RX ORDER — HYDROCODONE BITARTRATE AND ACETAMINOPHEN 10; 325 MG/1; MG/1
1 TABLET ORAL EVERY 6 HOURS PRN
Status: DISCONTINUED | OUTPATIENT
Start: 2024-06-05 | End: 2024-06-07 | Stop reason: HOSPADM

## 2024-06-05 RX ADMIN — CEFEPIME 2000 MG: 2 INJECTION, POWDER, FOR SOLUTION INTRAVENOUS at 20:37

## 2024-06-05 RX ADMIN — BISACODYL 5 MG: 5 TABLET, COATED ORAL at 08:22

## 2024-06-05 RX ADMIN — ESTRADIOL 1 MG: 1 TABLET ORAL at 08:22

## 2024-06-05 RX ADMIN — CEFEPIME 2000 MG: 2 INJECTION, POWDER, FOR SOLUTION INTRAVENOUS at 13:13

## 2024-06-05 RX ADMIN — CEFEPIME 2000 MG: 2 INJECTION, POWDER, FOR SOLUTION INTRAVENOUS at 04:04

## 2024-06-05 RX ADMIN — ROPINIROLE HYDROCHLORIDE 1 MG: 0.5 TABLET, FILM COATED ORAL at 20:36

## 2024-06-05 RX ADMIN — CYCLOBENZAPRINE 10 MG: 10 TABLET, FILM COATED ORAL at 03:59

## 2024-06-05 RX ADMIN — MORPHINE SULFATE 2 MG: 2 INJECTION, SOLUTION INTRAMUSCULAR; INTRAVENOUS at 08:23

## 2024-06-05 RX ADMIN — MORPHINE SULFATE 2 MG: 2 INJECTION, SOLUTION INTRAMUSCULAR; INTRAVENOUS at 03:58

## 2024-06-05 RX ADMIN — PANTOPRAZOLE SODIUM 40 MG: 40 TABLET, DELAYED RELEASE ORAL at 05:52

## 2024-06-05 RX ADMIN — MORPHINE SULFATE 2 MG: 2 INJECTION, SOLUTION INTRAMUSCULAR; INTRAVENOUS at 01:18

## 2024-06-05 RX ADMIN — MORPHINE SULFATE 2 MG: 2 INJECTION, SOLUTION INTRAMUSCULAR; INTRAVENOUS at 10:27

## 2024-06-05 RX ADMIN — SODIUM CHLORIDE, PRESERVATIVE FREE 10 ML: 5 INJECTION INTRAVENOUS at 20:40

## 2024-06-05 RX ADMIN — CLONAZEPAM 2 MG: 1 TABLET ORAL at 02:33

## 2024-06-05 RX ADMIN — HYDROCODONE BITARTRATE AND ACETAMINOPHEN 1 TABLET: 10; 325 TABLET ORAL at 17:39

## 2024-06-05 RX ADMIN — CYCLOBENZAPRINE 10 MG: 10 TABLET, FILM COATED ORAL at 13:22

## 2024-06-05 RX ADMIN — MORPHINE SULFATE 2 MG: 2 INJECTION, SOLUTION INTRAMUSCULAR; INTRAVENOUS at 20:40

## 2024-06-05 RX ADMIN — CYCLOBENZAPRINE 10 MG: 10 TABLET, FILM COATED ORAL at 20:36

## 2024-06-05 RX ADMIN — MORPHINE SULFATE 2 MG: 2 INJECTION, SOLUTION INTRAMUSCULAR; INTRAVENOUS at 05:52

## 2024-06-05 RX ADMIN — NALOXEGOL OXALATE 25 MG: 25 TABLET, FILM COATED ORAL at 11:47

## 2024-06-05 RX ADMIN — HYDROCODONE BITARTRATE AND ACETAMINOPHEN 1 TABLET: 10; 325 TABLET ORAL at 11:47

## 2024-06-05 RX ADMIN — CLONAZEPAM 2 MG: 1 TABLET ORAL at 22:53

## 2024-06-05 RX ADMIN — MORPHINE SULFATE 2 MG: 2 INJECTION, SOLUTION INTRAMUSCULAR; INTRAVENOUS at 15:30

## 2024-06-05 ASSESSMENT — PAIN DESCRIPTION - DESCRIPTORS
DESCRIPTORS: ACHING;SHARP
DESCRIPTORS: SHARP
DESCRIPTORS: SHARP
DESCRIPTORS: ACHING
DESCRIPTORS: SHARP;SORE
DESCRIPTORS: ACHING
DESCRIPTORS: ACHING
DESCRIPTORS: SHARP;SHOOTING;SPASM

## 2024-06-05 ASSESSMENT — PAIN - FUNCTIONAL ASSESSMENT
PAIN_FUNCTIONAL_ASSESSMENT: PREVENTS OR INTERFERES SOME ACTIVE ACTIVITIES AND ADLS

## 2024-06-05 ASSESSMENT — PAIN SCALES - GENERAL
PAINLEVEL_OUTOF10: 0
PAINLEVEL_OUTOF10: 0
PAINLEVEL_OUTOF10: 3
PAINLEVEL_OUTOF10: 7
PAINLEVEL_OUTOF10: 10
PAINLEVEL_OUTOF10: 7
PAINLEVEL_OUTOF10: 3
PAINLEVEL_OUTOF10: 0
PAINLEVEL_OUTOF10: 7
PAINLEVEL_OUTOF10: 10
PAINLEVEL_OUTOF10: 5
PAINLEVEL_OUTOF10: 0
PAINLEVEL_OUTOF10: 6
PAINLEVEL_OUTOF10: 0
PAINLEVEL_OUTOF10: 9
PAINLEVEL_OUTOF10: 3
PAINLEVEL_OUTOF10: 6
PAINLEVEL_OUTOF10: 3
PAINLEVEL_OUTOF10: 10
PAINLEVEL_OUTOF10: 3
PAINLEVEL_OUTOF10: 3

## 2024-06-05 ASSESSMENT — PAIN DESCRIPTION - ONSET
ONSET: ON-GOING

## 2024-06-05 ASSESSMENT — PAIN DESCRIPTION - LOCATION
LOCATION: BACK

## 2024-06-05 ASSESSMENT — PAIN DESCRIPTION - FREQUENCY
FREQUENCY: CONTINUOUS

## 2024-06-05 ASSESSMENT — PAIN DESCRIPTION - ORIENTATION
ORIENTATION: MID
ORIENTATION: MID
ORIENTATION: LOWER;MID;POSTERIOR
ORIENTATION: MID;POSTERIOR;LOWER
ORIENTATION: LOWER
ORIENTATION: LOWER;MID;POSTERIOR
ORIENTATION: LOWER;MID;POSTERIOR
ORIENTATION: MID
ORIENTATION: LOWER

## 2024-06-05 ASSESSMENT — PAIN DESCRIPTION - PAIN TYPE
TYPE: ACUTE PAIN;CHRONIC PAIN;SURGICAL PAIN
TYPE: ACUTE PAIN;SURGICAL PAIN;CHRONIC PAIN

## 2024-06-05 NOTE — PROGRESS NOTES
Per ELSY Cespedes NP please place a referral to pain management-[facility]; ELSY Cespedes dicussed with patient is the hospital of plan of care

## 2024-06-05 NOTE — PROGRESS NOTES
[facility]    Assessment: S/P lumbar wound washout    POD#1    Plan:  -continue medical management  -cultures from ER have resulted with Staph aureus  -ID to manage antibiotics    Subjective:    Objective:  Afebrile  VSS  GCS15  Moving all extremities  Incision CDI  Drain output yesterday 25cc    Patient Vitals for the past 12 hrs:   Temp Pulse Resp BP SpO2   06/05/24 0826 97.5 °F (36.4 °C) 90 18 (!) 145/103 98 %   06/05/24 0622 -- -- 16 -- --   06/05/24 0428 -- -- 16 -- --   06/05/24 0203 97.9 °F (36.6 °C) 79 16 120/82 95 %   06/05/24 0148 -- -- 16 -- --   06/05/24 0022 -- -- 18 -- --   06/04/24 2218 -- -- 18 -- --   06/04/24 2156 98.2 °F (36.8 °C) 92 18 111/83 94 %        Recent Results (from the past 24 hour(s))   Culture, Wound    Collection Time: 06/04/24  2:08 PM    Specimen: Back   Result Value Ref Range    Special Requests NO SPECIAL REQUESTS      Gram Stain PENDING     Culture (A)       LIGHT Staphylococcus aureus For identification and susceptibility refer to culture [id number]   Culture, Anaerobic    Collection Time: 06/04/24  2:08 PM    Specimen: Back   Result Value Ref Range    Special Requests NO SPECIAL REQUESTS      Culture        SUBCULTURE IS NECESSARY TO DETERMINE PRESENCE OR ABSENCE OF ANAEROBIC BACTERIA IN THIS CULTURE.  FURTHER REPORT TO FOLLOW AFTER INCUBATION OF SUBCULTURE.   Culture, Tissue    Collection Time: 06/04/24  2:13 PM    Specimen: Back   Result Value Ref Range    Special Requests NO SPECIAL REQUESTS      Gram Stain PENDING     Culture HEAVY Staphylococcus aureus SENSITIVITY TO FOLLOW (A)     Culture, Anaerobic    Collection Time: 06/04/24  2:13 PM    Specimen: Back   Result Value Ref Range    Special Requests NO SPECIAL REQUESTS      Culture        SUBCULTURE IS NECESSARY TO DETERMINE PRESENCE OR ABSENCE OF ANAEROBIC BACTERIA IN THIS CULTURE.  FURTHER REPORT TO FOLLOW AFTER INCUBATION OF SUBCULTURE.   Basic Metabolic Panel w/ Reflex to MG    Collection  Time: 06/05/24  6:43 AM   Result Value Ref Range    Sodium 139 136 - 145 mmol/L    Potassium 3.9 3.5 - 5.1 mmol/L    Chloride 101 98 - 107 mmol/L    CO2 29 (H) 20 - 28 mmol/L    Anion Gap 10 9 - 18 mmol/L    Glucose 104 (H) 70 - 99 mg/dL    BUN 11 6 - 23 MG/DL    Creatinine 0.50 (L) 0.60 - 1.10 MG/DL    Est, Glom Filt Rate >90 >60 ml/min/1.73m2    Calcium 9.2 8.8 - 10.2 MG/DL   CBC with Auto Differential    Collection Time: 06/05/24  6:43 AM   Result Value Ref Range    WBC 6.7 4.3 - 11.1 K/uL    RBC 3.90 (L) 4.05 - 5.2 M/uL    Hemoglobin 10.7 (L) 11.7 - 15.4 g/dL    Hematocrit 33.2 (L) 35.8 - 46.3 %    MCV 85.1 82 - 102 FL    MCH 27.4 26.1 - 32.9 PG    MCHC 32.2 31.4 - 35.0 g/dL    RDW 14.7 (H) 11.9 - 14.6 %    Platelets 377 150 - 450 K/uL    MPV 9.0 (L) 9.4 - 12.3 FL    nRBC 0.00 0.0 - 0.2 K/uL    Differential Type AUTOMATED      Neutrophils % 70 43 - 78 %    Lymphocytes % 22 13 - 44 %    Monocytes % 7 4.0 - 12.0 %    Eosinophils % 0 (L) 0.5 - 7.8 %    Basophils % 0 0.0 - 2.0 %    Immature Granulocytes % 1 0.0 - 5.0 %    Neutrophils Absolute 4.8 1.7 - 8.2 K/UL    Lymphocytes Absolute 1.5 0.5 - 4.6 K/UL    Monocytes Absolute 0.4 0.1 - 1.3 K/UL    Eosinophils Absolute 0.0 0.0 - 0.8 K/UL    Basophils Absolute 0.0 0.0 - 0.2 K/UL    Immature Granulocytes Absolute 0.0 0.0 - 0.5 K/UL        Yumiko Cespedes, APRN - CNP

## 2024-06-05 NOTE — THERAPY EVALUATION
ACUTE PHYSICAL THERAPY GOALS:   (Developed with and agreed upon by patient and/or caregiver.)  (1.) Nidia Thomas  will move from supine to sit and sit to supine , scoot up and down, and roll side to side with MODIFIED INDEPENDENCE within 7 treatment day(s).    (2.) Nidia Thomas will transfer from bed to chair and chair to bed with MODIFIED INDEPENDENCE using the least restrictive device within 7 treatment day(s).    (3.) Nidia Thomas will ambulate with MODIFIED INDEPENDENCE for 1000+ feet with the least restrictive device within 7 treatment day(s).   (4.) Nidia Thomas will perform standing static and dynamic balance activities x 25 minutes with MODIFIED INDEPENDENCE to improve safety within 7 treatment day(s).  (5.) Nidia Thomas will perform therapeutic exercises x 20 min for HEP with INDEPENDENCE to improve strength, endurance, and functional mobility within 7 treatment day(s).     PHYSICAL THERAPY Initial Assessment, Daily Note, and AM  (Link to Caseload Tracking: PT Visit Days : 1  Acknowledge Orders  Time In/Out  PT Charge Capture  Rehab Caseload Tracker    Nidia Thomas is a 48 y.o. female   PRIMARY DIAGNOSIS: Paraspinal abscess (HCC)  Abscess in epidural space of spine [G06.1]  Procedure(s) (LRB):  Wound irrigation and debridment (N/A)  1 Day Post-Op  Reason for Referral: Generalized Muscle Weakness (M62.81)  Other lack of cordination (R27.8)  Difficulty in walking, Not elsewhere classified (R26.2)  Other abnormalities of gait and mobility (R26.89)  Inpatient: Payor: [employer] / Plan: [place] [employer] [place] / Product Type: *No Product type* /     ASSESSMENT:     REHAB RECOMMENDATIONS:   Recommendation to date pending progress:  Setting:  No further skilled physical therapy after discharge from hospital    Equipment:     Recommend pt utilize shower chair -  discussed with patient who verbalizes understanding     ASSESSMENT:  Ms. Thomas is a

## 2024-06-05 NOTE — PROGRESS NOTES
Hospitalist Progress Note   Admit Date:  6/3/2024  6:23 PM   Name:  Nidia Thomas   Age:  48 y.o.  Sex:  female  :  1975   MRN:  [id number]   Room:  [facility]    Presenting/Chief Complaint: No chief complaint on file.     Reason(s) for Admission: Abscess in epidural space of spine [G06.1]     Hospital Course:   Nidia Thomas is a 48 y.o. female with past medical history of insomnia and mood disorder who presented to the ED on 6/3/2024 with cc of back pain.  She underwent L5-S1 TLIF with Dr Max on 24.  Patient had been doing well postop until she had a fall around  and landed on the incision site.  She had an MRI a couple days later that showed no acute abnormalities.  She continued to have worsening back pain and was seen in the ED on  and .  Her pain continued to worsen and on 6/3, she noted drainage from the incision site and return to the ED.  In the ED, labs were mostly unremarkable with WBC 6.5 and ESR 36.  CT lumbar spine with contrast showed abscess of the posterior lumbar soft tissue/paraspinal musculature without findings to suggest extension to the spinal canal.  Neurosurgery was consulted and recommended transfer to [facility] for admission. Patient underwent surgery yesterday with wound irrigation and debridement in her lower back. .      Subjective & 24hr Events:   Patient still has pain in her back.  No fever no chills.  No chest pain.  No nausea no vomiting.    Assessment & Plan:     This is a 48-year-old female with    Paraspinal abscess  Patient underwent L5-S1 TLIF on   Status post Wound irrigation and debridement on .  Wound cultures positive for MSSA.  ID consulted.  Appreciate recommendations.  Currently on cefepime.  May need to switch to cefazolin.  Neurosurgery on board.  Appreciate recommendations.  Pain management per pain scale.    Mood disorder  Continue home medications Klonopin    Insomnia  Continue home Ambien    Restless leg  - 104 U/L    Total Protein 6.6 6.3 - 8.2 g/dL    Albumin 3.1 (L) 3.5 - 5.0 g/dL    Globulin 3.5 2.3 - 3.5 g/dL    Albumin/Globulin Ratio 0.9 (L) 1.0 - 1.9     Culture, Blood 1    Collection Time: 06/03/24  1:11 PM    Specimen: Blood   Result Value Ref Range    Special Requests LEFT  Antecubital        Culture NO GROWTH 2 DAYS     Lactic Acid    Collection Time: 06/03/24  1:11 PM   Result Value Ref Range    Lactic Acid 0.7 0.5 - 2.0 mmol/L   Procalcitonin    Collection Time: 06/03/24  1:11 PM   Result Value Ref Range    Procalcitonin 0.05 0.00 - 0.10 ng/mL   Sedimentation Rate    Collection Time: 06/03/24  1:11 PM   Result Value Ref Range    Sed Rate, Automated 36 (H) 0 - 20 mm/hr   C-Reactive Protein    Collection Time: 06/03/24  1:11 PM   Result Value Ref Range    CRP 33 (H) 0 - 10 mg/L   Culture, Blood 1    Collection Time: 06/03/24  2:08 PM    Specimen: Blood   Result Value Ref Range    Special Requests RIGHT  HAND        Culture NO GROWTH 2 DAYS     Pregnancy, HCG qualitative serum    Collection Time: 06/03/24  8:57 PM   Result Value Ref Range    Preg, Serum Negative NEG     Renal Function Panel    Collection Time: 06/04/24  2:01 AM   Result Value Ref Range    Sodium 139 136 - 145 mmol/L    Potassium 4.7 3.5 - 5.1 mmol/L    Chloride 104 98 - 107 mmol/L    CO2 25 20 - 28 mmol/L    Anion Gap 10 9 - 18 mmol/L    Glucose 75 70 - 99 mg/dL    BUN 10 6 - 23 MG/DL    Creatinine 0.52 (L) 0.60 - 1.10 MG/DL    Est, Glom Filt Rate >90 >60 ml/min/1.73m2    Calcium 9.0 8.8 - 10.2 MG/DL    Phosphorus 4.1 2.5 - 4.5 MG/DL    Albumin 2.9 (L) 3.5 - 5.0 g/dL   CBC with Auto Differential    Collection Time: 06/04/24  2:01 AM   Result Value Ref Range    WBC 7.0 4.3 - 11.1 K/uL    RBC 4.16 4.05 - 5.2 M/uL    Hemoglobin 11.4 (L) 11.7 - 15.4 g/dL    Hematocrit 35.9 35.8 - 46.3 %    MCV 86.3 82 - 102 FL    MCH 27.4 26.1 - 32.9 PG    MCHC 31.8 31.4 - 35.0 g/dL    RDW 15.6 (H) 11.9 - 14.6 %    Platelets 381 150 - 450 K/uL    MPV 8.5 (L)

## 2024-06-05 NOTE — PROGRESS NOTES
Physician Progress Note      PATIENT:               HOLDEN GILLIAM  [facility] #:                  [id number]  :                       1975  ADMIT DATE:       6/3/2024 6:23 PM  DISCH DATE:  RESPONDING  PROVIDER #:        Carlos Cespedes NP          QUERY TEXT:    Dr Correa,    Patient admitted with Paraspinal abscess. Per Op note dated  documentation   of debridement. To accurately reflect the procedure performed please document   if debridement was excisional or nonexcisional and the deepest depth of tissue   removed as down to and including:      The medical record reflects the following:  Risk Factors: 49yo, GERD, mood disorder  Clinical Indicators: lumbar wound washout, Debridement documented in Op note  Treatment: Imaging, lab monitoring, IV ATBs    Thank You!  Courtney Gonzalez RN, CRCR  RN Clinical Documentation Integrity  Options provided:  -- Nonexcisional debridement of skin  -- Excisional debridement of skin  -- Nonexcisional debridement of subcutaneous tissue  -- Excisional debridement of subcutaneous tissue  -- Nonexcisional debridement of fascia  -- Excisional debridement of fascia  -- Nonexcisional debridement of muscle  -- Excisional debridement of muscle  -- Other - I will add my own diagnosis  -- Disagree - Not applicable / Not valid  -- Disagree - Clinically unable to determine / Unknown  -- Refer to Clinical Documentation Reviewer    PROVIDER RESPONSE TEXT:    Provider disagreed with this query.  sent to wrong provider. Send message to Dr. Correa who performed the surgery    Query created by: Courtney Gonzalez on 2024 1:38 PM      Electronically signed by:  Carlos Cespedes NP 2024 1:53 PM

## 2024-06-05 NOTE — PLAN OF CARE
Problem: Discharge Planning  Goal: Discharge to home or other facility with appropriate resources  6/5/2024 0955 by Keli Patterson RN  Outcome: Progressing  6/4/2024 2159 by Courtney Dixon RN  Outcome: Progressing     Problem: Safety - Adult  Goal: Free from fall injury  6/5/2024 0955 by Keli Patterson RN  Outcome: Progressing  6/4/2024 2159 by Courtney Dixon RN  Outcome: Progressing  Flowsheets (Taken 6/4/2024 1944)  Free From Fall Injury: Instruct family/caregiver on patient safety     Problem: ABCDS Injury Assessment  Goal: Absence of physical injury  6/5/2024 0955 by Keli Patterson RN  Outcome: Progressing  6/4/2024 2159 by Courtney Dixon RN  Outcome: Progressing  Flowsheets (Taken 6/4/2024 1944)  Absence of Physical Injury: Implement safety measures based on patient assessment     Problem: Pain  Goal: Verbalizes/displays adequate comfort level or baseline comfort level  6/5/2024 0955 by Keli Patterson RN  Outcome: Progressing  6/4/2024 2159 by Courtney Dixon RN  Outcome: Progressing  Flowsheets (Taken 6/4/2024 1944)  Verbalizes/displays adequate comfort level or baseline comfort level:   Assess pain using appropriate pain scale   Encourage patient to monitor pain and request assistance   Administer analgesics based on type and severity of pain and evaluate response   Implement non-pharmacological measures as appropriate and evaluate response   Notify Licensed Independent Practitioner if interventions unsuccessful or patient reports new pain     Problem: Neurosensory - Adult  Goal: Achieves stable or improved neurological status  6/5/2024 0955 by Keli Patterson RN  Outcome: Progressing  6/4/2024 2159 by Courtney Dixon RN  Outcome: Progressing  Goal: Achieves maximal functionality and self care  6/5/2024 0955 by Keli Patterson RN  Outcome: Progressing  6/4/2024 2159 by Courtney Dixon RN  Outcome: Progressing     Problem: Respiratory - Adult  Goal: Achieves optimal ventilation and  oxygenation  6/5/2024 0955 by Keli Patterson RN  Outcome: Progressing  6/4/2024 2159 by Courtney Dixon RN  Outcome: Progressing     Problem: Cardiovascular - Adult  Goal: Maintains optimal cardiac output and hemodynamic stability  6/5/2024 0955 by Keli Patterson RN  Outcome: Progressing  6/4/2024 2159 by Courtney Dixon RN  Outcome: Progressing     Problem: Skin/Tissue Integrity - Adult  Goal: Skin integrity remains intact  6/5/2024 0955 by Keli Patterson RN  Outcome: Progressing  6/4/2024 2159 by Courtney Dixon RN  Outcome: Progressing  Goal: Incisions, wounds, or drain sites healing without S/S of infection  6/5/2024 0955 by Keli Patterson RN  Outcome: Progressing  6/4/2024 2159 by Courtney Dixon RN  Outcome: Progressing     Problem: Gastrointestinal - Adult  Goal: Minimal or absence of nausea and vomiting  6/5/2024 0955 by Keli Patterson RN  Outcome: Progressing  6/4/2024 2159 by Courtney Dixon RN  Outcome: Progressing  Goal: Maintains or returns to baseline bowel function  6/5/2024 0955 by Keli Patterson RN  Outcome: Progressing  6/4/2024 2159 by Courtney Dixon RN  Outcome: Progressing  Goal: Maintains adequate nutritional intake  6/5/2024 0955 by Keli Patterson RN  Outcome: Progressing  6/4/2024 2159 by Courtney Dixon RN  Outcome: Progressing     Problem: Genitourinary - Adult  Goal: Absence of urinary retention  6/5/2024 0955 by Klei Patterson RN  Outcome: Progressing  6/4/2024 2159 by Courtney Dixon RN  Outcome: Progressing     Problem: Infection - Adult  Goal: Absence of infection at discharge  6/5/2024 0955 by eKli Patterson RN  Outcome: Progressing  6/4/2024 2159 by Courtney Dixon RN  Outcome: Progressing     Problem: Metabolic/Fluid and Electrolytes - Adult  Goal: Electrolytes maintained within normal limits  6/5/2024 0955 by Keli Patterson RN  Outcome: Progressing  6/4/2024 2159 by Courtney Dixon, RN  Outcome: Progressing  Goal: Hemodynamic stability and optimal

## 2024-06-05 NOTE — PROGRESS NOTES
Physician Progress Note      PATIENT:               HOLDEN GILLIAM  CSN #:                  [id number]  :                       1975  ADMIT DATE:       6/3/2024 6:23 PM  DISCH DATE:  RESPONDING  PROVIDER #:        Myra Armstrong MD          QUERY TEXT:    Pt admitted with Paraspinal abscess and underwent L5-S1 TLIF on .? If   possible, please document in progress notes and discharge summary the   relationship if any between the Paraspinal abscess and the surgery:    The medical record reflects the following:  Risk Factors: 48 yr old, GERD, mood disorder  Clinical Indicators: Paraspinal abscess, Patient underwent L5-S1 TLIF on   Treatment: Imaging, lab monitoring, IV ATBs, Neurosurgery consult    Thank You!  Courtney Gonzalez RN, CRCR  RN Clinical Documentation Integrity  Options provided:  -- Paraspinal abscess is due to the procedure  -- Paraspinal abscess is not due to the procedure, but is due to other   incidental risk factor, Please specify other incidental risk factor.  -- Other - I will add my own diagnosis  -- Disagree - Not applicable / Not valid  -- Disagree - Clinically unable to determine / Unknown  -- Refer to Clinical Documentation Reviewer    PROVIDER RESPONSE TEXT:    Paraspinal abscess is not due to the procedure but due to Pt had a fall after   the surgery, causing paraspinal abscess    Query created by: Courtney Gonzalez on 2024 1:30 PM      Electronically signed by:  Myra Armstrong MD 2024 1:55 PM

## 2024-06-05 NOTE — PROGRESS NOTES
ACUTE OCCUPATIONAL THERAPY GOALS:   (Developed with and agreed upon by patient and/or caregiver.)  1. Patient will verbalize and demonstrate understanding of spinal precautions with 100% accuracy during ADLs.   2. Patient will complete lower body bathing and dressing with MOD I and adaptive equipment as needed.   3. Patient will complete functional transfers with MOD I and adaptive equipment as needed.   4. Patient will complete toileting and toilet transfer with MOD I.   5. Patient will complete functional mobility of household distances with MOD I and adaptive equipment as needed.   6. Patient will demonstrate ability to log roll in bed with MOD I.    Timeframe: 7 visits      OCCUPATIONAL THERAPY Initial Assessment and Daily Note       OT Visit Days: 1  Acknowledge Orders  Time  OT Charge Capture  Rehab Caseload Tracker      Spinal Precautions    Nidia Thomas is a 48 y.o. female   PRIMARY DIAGNOSIS: Paraspinal abscess (HCC)  Abscess in epidural space of spine [G06.1]  Procedure(s) (LRB):  Wound irrigation and debridment (N/A)  1 Day Post-Op  Reason for Referral: Generalized Muscle Weakness (M62.81)  Difficulty in walking, Not elsewhere classified (R26.2)  Other abnormalities of gait and mobility (R26.89)  History of falling (Z91.81)  Inpatient: Payor: [employer] / Plan: [place] / Product Type: *No Product type* /     ASSESSMENT:     REHAB RECOMMENDATIONS:   Recommendation to date pending progress:  Setting:  No further skilled occupational therapy after discharge from hospital    Equipment:     Recommended Shower Chair--pt has rolling walker at home     ASSESSMENT:  Ms. Thomas  is a 47 y/o female presents s/p wound I&D from previous spinal surgery on 5/8 and is still on spinal precautions, endorses falls since surgery. At baseline pt lives with her , is independent all ADLs and drives. Today pt presents with decreased balance, strength and increased pain impacting ADLs.  Dressing [] [] [] [] [] [] [] [] [] [x]    Lower Body Dressing [] [] [] [x] [] [] [] [] [] [] Donning slippers edge of bed    Feeding [] [] [] [] [] [] [] [] [] [x]    Grooming [] [] [] [] [x] [] [] [] [] [] Brushing teeth, washing face, brushing hair standing at sink   Personal Device Care [] [] [] [] [] [] [] [] [] [x]    Functional Mobility [] [] [] [] [x] [] [] [] [] [] RW   I=Independent, Mod I=Modified Independent, S=Supervision/Setup, SBA=Standby Assistance, CGA=Contact Guard Assistance, Min=Minimal Assistance, Mod=Moderate Assistance, Max=Maximal Assistance, Total=Total Assistance, NT=Not Tested    PLAN:   FREQUENCY/DURATION   OT Plan of Care: 3 times/week for duration of hospital stay or until stated goals are met, whichever comes first.    PROBLEM LIST:   (Skilled intervention is medically necessary to address:)  Decreased ADL/Functional Activities  Decreased Activity Tolerance  Decreased Balance  Decreased Safety Awareness  Decreased Strength  Decreased Transfer Abilities  Increased Pain   INTERVENTIONS PLANNED:  (Benefits and precautions of occupational therapy have been discussed with the patient.)  Self Care Training  Therapeutic Activity  Therapeutic Exercise/HEP  Neuromuscular Re-education  Manual Therapy  Education         TREATMENT:     EVALUATION: LOW COMPLEXITY: (Untimed Charge)  The initial evaluation charge encompasses clinical chart review, objective assessment, interpretation of assessment, and skilled monitoring of the patient's response to treatment in order to develop a plan of care.     TREATMENT:   Self Care (23 minutes): Patient participated in lower body dressing and grooming ADLs in unsupported sitting and standing with minimal verbal, manual, and tactile cueing to increase independence, decrease assistance required, increase activity tolerance, and maintain precautions. Patient also participated in bed mobility, functional mobility, functional transfer, and adaptive equipment

## 2024-06-05 NOTE — CONSULTS
Infectious Disease Consult    Today's Date: 6/5/2024   Admit Date: 6/3/2024  1975      Impression:   Postoperative lumbar wound infection: s/p L5-S1 TLIF (5/8/24) with wound opening, CT with abscess: s/p I&D op cxs + SA; wound swab cx sent (6/3) + MSSA    Plan:    Would recommend stopping Cefepime and transitioning to Ancef 2 g Q 8 hr while inpatient.  Given operative report and what appears to be no concern for deep infection will likely recommend treating with HD Duricef 1000 mg PO BID x 4 weeks.    Anti-infectives:   Cefepime 6/3-  Zosyn 6/3  Vanc 6/3-6/4    Subjective:   Date of Consultation:  June 5, 2024  Referring Physician: Dr. Gutierrez    Patient is a 48 y.o. female who presented to ED with complaint of back pain on 6/3/24. She recently underwent L5-S1 TLIF on 5/8/24. She had been doing well postoperatively until she fell on 5/22. She landed on incision site. MRI revealed no acute abnormalities, but she continued to have worsening back pain. She presented to ED multiple times prior to admission. In ED she was found to have drainage from incision site. She had WBC 6.5, ESR 36. CT revealed abscess of posterior lumbar soft tissue/paraspinal musculature without findings to suggest extension to the spinal canal. Neurosurgery recommended transfer downtown. She was taken to OR on 6/4 where she underwent I&D, per op note small opening seen at top of wound with fluid and devitalized cultures removed, fascia intact, cultures sent +SA. Previous wound swab cx sent on 6/3 + MSSA. Admission blood cultures negative.  Patient seen resting in bed. Reports extensive history of former R hip replacement and prior disc space fusions. She also reports cage placement in lumbar spine about 19 years prior.     Patient Active Problem List   Diagnosis    Anal fissure    Hemorrhoids    Anxiety    Dysmenorrhea    Lumbar radiculopathy    Lumbar disc herniation    History of lumbar fusion    Paraspinal abscess (HCC)    DDD (degenerative     C5-C6 with Dr Hills    CHOLECYSTECTOMY  1999    GASTRECTOMY      gastric sleeve    HEMORRHOID SURGERY      KNEE ARTHROSCOPY  10/12    L knee scope    KNEE ARTHROSCP HARV  x 2 1990, 1993    RIGHT    LUMBAR FUSION  2006    L5, S1 discectomy/fusion with a frontal approach    LUMBAR SPINE SURGERY Left 05/08/2024    left-sided L3-L4 minimally invasive hemilaminectomy, medial facetectomy and discectomy performed by Gallo Max MD at [facility] MAIN OR    ORTHOPEDIC SURGERY Left 5/2011    left foot surg    OTHER SURGICAL HISTORY  2004    left hand I&D    OTHER SURGICAL HISTORY  5/2013    EUA- sphincterectomy    MICHAEL AND BSO (CERVIX REMOVED)      TOTAL COLECTOMY  09/2017    subtotal colectomy for idiopathic constipation    TOTAL HIP ARTHROPLASTY Right       Prior to Admission medications    Medication Sig Start Date End Date Taking? Authorizing Provider   clonazePAM (KLONOPIN) 2 MG tablet Take 1 tablet by mouth nightly as needed for Anxiety.    Elmo Hurst MD   dexAMETHasone (DECADRON) 6 MG tablet Take 1 tablet by mouth daily (with breakfast) for 5 days 6/1/24 6/6/24  Demond Summers III, MD   cyclobenzaprine (FLEXERIL) 10 MG tablet Take 1 tablet by mouth 3 times daily as needed for Muscle spasms 5/30/24 6/9/24  Gallo Max MD   ondansetron (ZOFRAN-ODT) 4 MG disintegrating tablet Take 1 tablet by mouth 3 times daily as needed for Nausea or Vomiting 5/8/24   Gallo Max MD   amoxicillin-clavulanate (AUGMENTIN) 875-125 MG per tablet Take 1 tablet by mouth 2 times daily    Elmo Hurst MD   ALPRAZolam (XANAX) 1 MG tablet Take 1 tablet by mouth 2 times daily as needed for Sleep.    Elmo Hurst MD   estradiol (ESTRACE) 1 MG tablet Take 1 tablet by mouth daily    Elmo Hurst MD   zolpidem (AMBIEN CR) 12.5 MG extended release tablet Take 1 tablet by mouth nightly.    Elmo Hurst MD   Multiple Vitamins-Minerals (CENTRUM SILVER ADULT 50+) TABS Take 1 tablet by mouth daily    Aris

## 2024-06-06 LAB
ALBUMIN SERPL-MCNC: 2.7 G/DL (ref 3.5–5)
ALBUMIN/GLOB SERPL: 0.9 (ref 1–1.9)
ALP SERPL-CCNC: 212 U/L (ref 35–104)
ALT SERPL-CCNC: 35 U/L (ref 12–65)
ANION GAP SERPL CALC-SCNC: 9 MMOL/L (ref 9–18)
AST SERPL-CCNC: 24 U/L (ref 15–37)
BACTERIA SPEC CULT: ABNORMAL
BACTERIA SPEC CULT: ABNORMAL
BASOPHILS # BLD: 0.1 K/UL (ref 0–0.2)
BASOPHILS NFR BLD: 1 % (ref 0–2)
BILIRUB SERPL-MCNC: <0.2 MG/DL (ref 0–1.2)
BUN SERPL-MCNC: 14 MG/DL (ref 6–23)
CALCIUM SERPL-MCNC: 8.9 MG/DL (ref 8.8–10.2)
CHLORIDE SERPL-SCNC: 103 MMOL/L (ref 98–107)
CO2 SERPL-SCNC: 30 MMOL/L (ref 20–28)
CREAT SERPL-MCNC: 0.57 MG/DL (ref 0.6–1.1)
DIFFERENTIAL METHOD BLD: ABNORMAL
EOSINOPHIL # BLD: 0.2 K/UL (ref 0–0.8)
EOSINOPHIL NFR BLD: 3 % (ref 0.5–7.8)
ERYTHROCYTE [DISTWIDTH] IN BLOOD BY AUTOMATED COUNT: 15.1 % (ref 11.9–14.6)
FUNGAL CULT/SMEAR: NORMAL
FUNGAL CULT/SMEAR: NORMAL
FUNGUS SMEAR: NORMAL
FUNGUS SMEAR: NORMAL
GLOBULIN SER CALC-MCNC: 3 G/DL (ref 2.3–3.5)
GLUCOSE SERPL-MCNC: 81 MG/DL (ref 70–99)
GRAM STN SPEC: ABNORMAL
HCT VFR BLD AUTO: 34.4 % (ref 35.8–46.3)
HGB BLD-MCNC: 10.9 G/DL (ref 11.7–15.4)
IMM GRANULOCYTES # BLD AUTO: 0.1 K/UL (ref 0–0.5)
IMM GRANULOCYTES NFR BLD AUTO: 1 % (ref 0–5)
LYMPHOCYTES # BLD: 3.9 K/UL (ref 0.5–4.6)
LYMPHOCYTES NFR BLD: 47 % (ref 13–44)
MCH RBC QN AUTO: 27.5 PG (ref 26.1–32.9)
MCHC RBC AUTO-ENTMCNC: 31.7 G/DL (ref 31.4–35)
MCV RBC AUTO: 86.6 FL (ref 82–102)
MONOCYTES # BLD: 0.5 K/UL (ref 0.1–1.3)
MONOCYTES NFR BLD: 7 % (ref 4–12)
NEUTS SEG # BLD: 3.3 K/UL (ref 1.7–8.2)
NEUTS SEG NFR BLD: 41 % (ref 43–78)
NRBC # BLD: 0 K/UL (ref 0–0.2)
PLATELET # BLD AUTO: 359 K/UL (ref 150–450)
PMV BLD AUTO: 8.6 FL (ref 9.4–12.3)
POTASSIUM SERPL-SCNC: 4.5 MMOL/L (ref 3.5–5.1)
PROT SERPL-MCNC: 5.7 G/DL (ref 6.3–8.2)
RBC # BLD AUTO: 3.97 M/UL (ref 4.05–5.2)
SERVICE CMNT-IMP: ABNORMAL
SERVICE CMNT-IMP: ABNORMAL
SODIUM SERPL-SCNC: 142 MMOL/L (ref 136–145)
SPECIMEN PROCESSING: NORMAL
SPECIMEN PROCESSING: NORMAL
SPECIMEN SOURCE: NORMAL
WBC # BLD AUTO: 8.1 K/UL (ref 4.3–11.1)

## 2024-06-06 PROCEDURE — 6370000000 HC RX 637 (ALT 250 FOR IP): Performed by: NEUROLOGICAL SURGERY

## 2024-06-06 PROCEDURE — 80053 COMPREHEN METABOLIC PANEL: CPT

## 2024-06-06 PROCEDURE — 2580000003 HC RX 258: Performed by: NEUROLOGICAL SURGERY

## 2024-06-06 PROCEDURE — 6360000002 HC RX W HCPCS: Performed by: HOSPITALIST

## 2024-06-06 PROCEDURE — 6360000002 HC RX W HCPCS: Performed by: NEUROLOGICAL SURGERY

## 2024-06-06 PROCEDURE — 1100000000 HC RM PRIVATE

## 2024-06-06 PROCEDURE — 85025 COMPLETE CBC W/AUTO DIFF WBC: CPT

## 2024-06-06 PROCEDURE — 6370000000 HC RX 637 (ALT 250 FOR IP): Performed by: HOSPITALIST

## 2024-06-06 PROCEDURE — 97530 THERAPEUTIC ACTIVITIES: CPT

## 2024-06-06 PROCEDURE — 36415 COLL VENOUS BLD VENIPUNCTURE: CPT

## 2024-06-06 PROCEDURE — 99024 POSTOP FOLLOW-UP VISIT: CPT | Performed by: NURSE PRACTITIONER

## 2024-06-06 PROCEDURE — 94761 N-INVAS EAR/PLS OXIMETRY MLT: CPT

## 2024-06-06 PROCEDURE — 2580000003 HC RX 258: Performed by: HOSPITALIST

## 2024-06-06 RX ADMIN — CEFEPIME 2000 MG: 2 INJECTION, POWDER, FOR SOLUTION INTRAVENOUS at 06:19

## 2024-06-06 RX ADMIN — PANTOPRAZOLE SODIUM 40 MG: 40 TABLET, DELAYED RELEASE ORAL at 06:18

## 2024-06-06 RX ADMIN — HYDROCODONE BITARTRATE AND ACETAMINOPHEN 1 TABLET: 10; 325 TABLET ORAL at 00:03

## 2024-06-06 RX ADMIN — ROPINIROLE HYDROCHLORIDE 1 MG: 0.5 TABLET, FILM COATED ORAL at 20:33

## 2024-06-06 RX ADMIN — NALOXEGOL OXALATE 25 MG: 25 TABLET, FILM COATED ORAL at 06:22

## 2024-06-06 RX ADMIN — MORPHINE SULFATE 2 MG: 2 INJECTION, SOLUTION INTRAMUSCULAR; INTRAVENOUS at 20:33

## 2024-06-06 RX ADMIN — SODIUM CHLORIDE, PRESERVATIVE FREE 10 ML: 5 INJECTION INTRAVENOUS at 08:50

## 2024-06-06 RX ADMIN — CEFAZOLIN 2000 MG: 10 INJECTION, POWDER, FOR SOLUTION INTRAVENOUS at 20:32

## 2024-06-06 RX ADMIN — CLONAZEPAM 2 MG: 1 TABLET ORAL at 22:21

## 2024-06-06 RX ADMIN — MORPHINE SULFATE 2 MG: 2 INJECTION, SOLUTION INTRAMUSCULAR; INTRAVENOUS at 17:11

## 2024-06-06 RX ADMIN — CEFAZOLIN 2000 MG: 10 INJECTION, POWDER, FOR SOLUTION INTRAVENOUS at 11:38

## 2024-06-06 RX ADMIN — MORPHINE SULFATE 2 MG: 2 INJECTION, SOLUTION INTRAMUSCULAR; INTRAVENOUS at 23:35

## 2024-06-06 RX ADMIN — MORPHINE SULFATE 2 MG: 2 INJECTION, SOLUTION INTRAMUSCULAR; INTRAVENOUS at 13:25

## 2024-06-06 RX ADMIN — ZOLPIDEM TARTRATE 5 MG: 5 TABLET ORAL at 22:21

## 2024-06-06 RX ADMIN — BISACODYL 5 MG: 5 TABLET, COATED ORAL at 08:50

## 2024-06-06 RX ADMIN — ESTRADIOL 1 MG: 1 TABLET ORAL at 08:50

## 2024-06-06 RX ADMIN — CYCLOBENZAPRINE 10 MG: 10 TABLET, FILM COATED ORAL at 06:18

## 2024-06-06 RX ADMIN — MORPHINE SULFATE 2 MG: 2 INJECTION, SOLUTION INTRAMUSCULAR; INTRAVENOUS at 09:07

## 2024-06-06 RX ADMIN — MORPHINE SULFATE 2 MG: 2 INJECTION, SOLUTION INTRAMUSCULAR; INTRAVENOUS at 02:22

## 2024-06-06 RX ADMIN — HYDROCODONE BITARTRATE AND ACETAMINOPHEN 1 TABLET: 10; 325 TABLET ORAL at 06:18

## 2024-06-06 RX ADMIN — CYCLOBENZAPRINE 10 MG: 10 TABLET, FILM COATED ORAL at 23:41

## 2024-06-06 RX ADMIN — ZOLPIDEM TARTRATE 5 MG: 5 TABLET ORAL at 00:03

## 2024-06-06 RX ADMIN — SODIUM CHLORIDE, PRESERVATIVE FREE 5 ML: 5 INJECTION INTRAVENOUS at 20:32

## 2024-06-06 ASSESSMENT — PAIN DESCRIPTION - ORIENTATION
ORIENTATION: POSTERIOR
ORIENTATION: POSTERIOR
ORIENTATION: LOWER

## 2024-06-06 ASSESSMENT — PAIN SCALES - GENERAL
PAINLEVEL_OUTOF10: 0
PAINLEVEL_OUTOF10: 0
PAINLEVEL_OUTOF10: 6
PAINLEVEL_OUTOF10: 0
PAINLEVEL_OUTOF10: 8
PAINLEVEL_OUTOF10: 9
PAINLEVEL_OUTOF10: 8
PAINLEVEL_OUTOF10: 8
PAINLEVEL_OUTOF10: 7
PAINLEVEL_OUTOF10: 7
PAINLEVEL_OUTOF10: 9
PAINLEVEL_OUTOF10: 0

## 2024-06-06 ASSESSMENT — PAIN - FUNCTIONAL ASSESSMENT
PAIN_FUNCTIONAL_ASSESSMENT: PREVENTS OR INTERFERES SOME ACTIVE ACTIVITIES AND ADLS
PAIN_FUNCTIONAL_ASSESSMENT: ACTIVITIES ARE NOT PREVENTED
PAIN_FUNCTIONAL_ASSESSMENT: PREVENTS OR INTERFERES SOME ACTIVE ACTIVITIES AND ADLS
PAIN_FUNCTIONAL_ASSESSMENT: PREVENTS OR INTERFERES SOME ACTIVE ACTIVITIES AND ADLS
PAIN_FUNCTIONAL_ASSESSMENT: ACTIVITIES ARE NOT PREVENTED

## 2024-06-06 ASSESSMENT — PAIN DESCRIPTION - LOCATION
LOCATION: BACK

## 2024-06-06 ASSESSMENT — PAIN DESCRIPTION - PAIN TYPE
TYPE: ACUTE PAIN;SURGICAL PAIN

## 2024-06-06 ASSESSMENT — PAIN DESCRIPTION - FREQUENCY
FREQUENCY: INTERMITTENT
FREQUENCY: CONTINUOUS

## 2024-06-06 ASSESSMENT — PAIN DESCRIPTION - DESCRIPTORS
DESCRIPTORS: DISCOMFORT;ACHING
DESCRIPTORS: DISCOMFORT
DESCRIPTORS: ACHING;SORE
DESCRIPTORS: SHARP
DESCRIPTORS: ACHING;SORE
DESCRIPTORS: DISCOMFORT
DESCRIPTORS: DISCOMFORT

## 2024-06-06 ASSESSMENT — PAIN DESCRIPTION - ONSET
ONSET: GRADUAL
ONSET: GRADUAL
ONSET: ON-GOING
ONSET: GRADUAL

## 2024-06-06 NOTE — PROGRESS NOTES
Occupational Therapy Note:    Attempted to see patient this PM for occupational therapy treatment  session. Pt elected to for pain medicine instead of working with therapy. Will follow and re-attempt as schedule permits/patient available. Thank you,    NICANOR Cabello, OT    Rehab Caseload Tracker

## 2024-06-06 NOTE — DISCHARGE INSTRUCTIONS
Discharge Instructions - Lumbar Surgery    Pain Medicine  Take pain medicine every 6hrs as needed.  You are encouraged to space out your pain pill doses until you are able wean off.  Pain medicine can cause constipation and upset stomach so it is advised to take with food.  It is encouraged to take a daily over the counter stool softer and drink plenty of water to help prevent constipation.  If you need a medication refill be aware it may take up to 3 days for a prescription to be called in.    Incision  Keep surgical site clean and dry.  You may use 4x4s (gauze) and skin tape when redressing incision    Please have someone check your incisions daily. If any of the following should occur, please call the office:   Drainage from incision site   Opening of incisions   Fevers greater than 101 degrees   Flu-like symptoms   Increased redness and/or tenderness  If you have staples or sutures instead of tape on your incision, they may be removed 10-14 days following your surgery. This may be done by a visiting nurse, rehab physician, or at your first follow up visit at our office. Otherwise, if your wound is covered with tape strips, they will typically fall of with showering.    Brace  If a brace is prescribed, wear it at when you up and out of bed.  Always wear a t-shirt under your brace so that it is not directly in contact with your bare skin.  The brace may cause you to sweat and you may feel warm. This can irritate your skin so pay special attention to the incision.    Showering  If your surgeon allows, you may shower as normal once the large, bulky bandages are removed from your incision. If they are not removed before your discharge from the hospital, you may remove them 36-48 hours after your surgery. Do not scrub at incisions but may allow soapy water to wash over them.  Pat them dry after showering and allow to fully dry before re-dressing. Hair washing is permissible while in the shower. No tub baths, hot

## 2024-06-06 NOTE — PROGRESS NOTES
ACUTE PHYSICAL THERAPY GOALS:   (Developed with and agreed upon by patient and/or caregiver.)  (1.) Nidia Thomas  will move from supine to sit and sit to supine , scoot up and down, and roll side to side with MODIFIED INDEPENDENCE within 7 treatment day(s).    (2.) Nidia Thomas will transfer from bed to chair and chair to bed with MODIFIED INDEPENDENCE using the least restrictive device within 7 treatment day(s).    (3.) Nidia Thomas will ambulate with MODIFIED INDEPENDENCE for 1000+ feet with the least restrictive device within 7 treatment day(s).   (4.) Nidia Thomas will perform standing static and dynamic balance activities x 25 minutes with MODIFIED INDEPENDENCE to improve safety within 7 treatment day(s).  (5.) Nidia Thomas will perform therapeutic exercises x 20 min for HEP with INDEPENDENCE to improve strength, endurance, and functional mobility within 7 treatment day(s).     PHYSICAL THERAPY: Daily Note PM   (Link to Caseload Tracking: PT Visit Days : 2  Time In/Out PT Charge Capture  Rehab Caseload Tracker  Orders    Nidia Thomas is a 48 y.o. female   PRIMARY DIAGNOSIS: Paraspinal abscess (HCC)  Abscess in epidural space of spine [G06.1]  Procedure(s) (LRB):  Wound irrigation and debridment (N/A)  2 Days Post-Op  Inpatient: Payor: [employer] / [place] / Product Type: *No Product type* /     ASSESSMENT:     REHAB RECOMMENDATIONS:   Recommendation to date pending progress:  Setting:  No further skilled physical therapy after discharge from hospital    Equipment:      Recommend pt utilize shower chair -  discussed with patient who verbalizes understanding     ASSESSMENT:    Ms. Thomas is a 48 year old F who presents s/p lumbar wound irrigation and drain placement after spinal surgery on 5/8. Endorses two falls since surgery.      This date pt performs mobility including bed mobility (log roll technique), sitting

## 2024-06-06 NOTE — PROGRESS NOTES
Hospitalist Progress Note   Admit Date:  6/3/2024  6:23 PM   Name:  Nidia Thomas   Age:  48 y.o.  Sex:  female  :  1975   MRN:  [id number]   Room:  [facility]    Presenting/Chief Complaint: No chief complaint on file.     Reason(s) for Admission: Abscess in epidural space of spine [G06.1]     Hospital Course:   Nidia Thomas is a 48 y.o. female with past medical history of insomnia and mood disorder who presented to the ED on 6/3/2024 with cc of back pain.  She underwent L5-S1 TLIF with Dr Max on 24.  Patient had been doing well postop until she had a fall around  and landed on the incision site.  She had an MRI a couple days later that showed no acute abnormalities.  She continued to have worsening back pain and was seen in the ED on  and .  Her pain continued to worsen and on 6/3, she noted drainage from the incision site and return to the ED.  In the ED, labs were mostly unremarkable with WBC 6.5 and ESR 36.  CT lumbar spine with contrast showed abscess of the posterior lumbar soft tissue/paraspinal musculature without findings to suggest extension to the spinal canal.  Neurosurgery was consulted and recommended transfer to [facility] for admission. Patient underwent surgery yesterday with wound irrigation and debridement in her lower back. .      Subjective & 24hr Events:   Patient is resting in bed.  Postop drain removed today.  No fever no chills.  Still has some pain in her back although improving.  No nausea no vomiting.  She had BM yesterday.    Assessment & Plan:     This is a 48-year-old female with    Paraspinal abscess status post wound irrigation and debridement on , post-op day 2.  Patient underwent L5-S1 TLIF on   Wound cultures positive for MSSA.  ID consulted.  Appreciate recommendations.  Cefazolin.  ID  recommends switching to cefadroxil 1 g p.o. twice daily for 4 weeks on discharge. EOT 24  Neurosurgery on board.  Appreciate  tablet 25 mg  25 mg Oral QAM AC    HYDROcodone-acetaminophen (NORCO)  MG per tablet 1 tablet  1 tablet Oral Q6H PRN    morphine (PF) injection 2 mg  2 mg IntraVENous Q3H PRN    clonazePAM (KLONOPIN) tablet 2 mg  2 mg Oral Nightly PRN    sodium chloride flush 0.9 % injection 5-40 mL  5-40 mL IntraVENous 2 times per day    sodium chloride flush 0.9 % injection 5-40 mL  5-40 mL IntraVENous PRN    0.9 % sodium chloride infusion   IntraVENous PRN    potassium chloride (KLOR-CON M) extended release tablet 40 mEq  40 mEq Oral PRN    Or    potassium bicarb-citric acid (EFFER-K) effervescent tablet 40 mEq  40 mEq Oral PRN    Or    potassium chloride 10 mEq/100 mL IVPB (Peripheral Line)  10 mEq IntraVENous PRN    magnesium sulfate 2000 mg in 50 mL IVPB premix  2,000 mg IntraVENous PRN    ondansetron (ZOFRAN-ODT) disintegrating tablet 4 mg  4 mg Oral Q8H PRN    Or    ondansetron (ZOFRAN) injection 4 mg  4 mg IntraVENous Q6H PRN    polyethylene glycol (GLYCOLAX) packet 17 g  17 g Oral Daily PRN    acetaminophen (TYLENOL) tablet 650 mg  650 mg Oral Q6H PRN    Or    acetaminophen (TYLENOL) suppository 650 mg  650 mg Rectal Q6H PRN    bisacodyl (DULCOLAX) EC tablet 5 mg  5 mg Oral Daily    estradiol (ESTRACE) tablet 1 mg  1 mg Oral Daily    pantoprazole (PROTONIX) tablet 40 mg  40 mg Oral QAM AC    rOPINIRole (REQUIP) tablet 1 mg  1 mg Oral Nightly    ALPRAZolam (XANAX) tablet 1 mg  1 mg Oral BID PRN    zolpidem (AMBIEN) tablet 5 mg  5 mg Oral Nightly PRN    cyclobenzaprine (FLEXERIL) tablet 10 mg  10 mg Oral TID PRN       Signed:  Myra Gutierrez MD    Part of this note may have been written by using a voice dictation software.  The note has been proof read but may still contain some grammatical/other typographical errors.

## 2024-06-06 NOTE — PROGRESS NOTES
[facility]    Assessment: S/P lumbar wound washout    POD#2    Plan:  -continue medical management  -cultures  have resulted with Staph aureus and MSSA  -remove drain  -ID made final antibiotic recommendations HD Duricef 1000 mg PO BID x 4 weeks, EOT 7/2/24  -pt contacted by [facility]  -pt clear to discharge home from neurosurgery perspective    Subjective:    Objective:  Afebrile  VSS  GCS15  Moving all extremities  Incision CDI  Drain output yesterday 5cc    Patient Vitals for the past 12 hrs:   Temp Pulse Resp BP SpO2   06/06/24 0736 97.3 °F (36.3 °C) 72 16 117/78 98 %   06/06/24 0436 -- 71 18 -- 94 %   06/06/24 0252 -- -- 16 -- --          Recent Results (from the past 24 hour(s))   CBC with Auto Differential    Collection Time: 06/06/24  4:34 AM   Result Value Ref Range    WBC 8.1 4.3 - 11.1 K/uL    RBC 3.97 (L) 4.05 - 5.2 M/uL    Hemoglobin 10.9 (L) 11.7 - 15.4 g/dL    Hematocrit 34.4 (L) 35.8 - 46.3 %    MCV 86.6 82 - 102 FL    MCH 27.5 26.1 - 32.9 PG    MCHC 31.7 31.4 - 35.0 g/dL    RDW 15.1 (H) 11.9 - 14.6 %    Platelets 359 150 - 450 K/uL    MPV 8.6 (L) 9.4 - 12.3 FL    nRBC 0.00 0.0 - 0.2 K/uL    Differential Type AUTOMATED      Neutrophils % 41 (L) 43 - 78 %    Lymphocytes % 47 (H) 13 - 44 %    Monocytes % 7 4.0 - 12.0 %    Eosinophils % 3 0.5 - 7.8 %    Basophils % 1 0.0 - 2.0 %    Immature Granulocytes % 1 0.0 - 5.0 %    Neutrophils Absolute 3.3 1.7 - 8.2 K/UL    Lymphocytes Absolute 3.9 0.5 - 4.6 K/UL    Monocytes Absolute 0.5 0.1 - 1.3 K/UL    Eosinophils Absolute 0.2 0.0 - 0.8 K/UL    Basophils Absolute 0.1 0.0 - 0.2 K/UL    Immature Granulocytes Absolute 0.1 0.0 - 0.5 K/UL   Comprehensive Metabolic Panel w/ Reflex to MG    Collection Time: 06/06/24  4:34 AM   Result Value Ref Range    Sodium 142 136 - 145 mmol/L    Potassium 4.5 3.5 - 5.1 mmol/L    Chloride 103 98 - 107 mmol/L    CO2 30 (H) 20 - 28 mmol/L    Anion Gap 9 9 - 18 mmol/L    Glucose 81 70 - 99

## 2024-06-06 NOTE — PROGRESS NOTES
Infectious Disease Progress Note    Today's Date: 6/6/2024   Admit Date: 6/3/2024  1975      Impression:   Postoperative lumbar wound infection: s/p L5-S1 TLIF (5/8/24) with wound opening, CT with abscess: s/p I&D (6/4) op cxs + MSSA; wound swab cx sent (6/3) + MSSA    Plan:     Plan to treat with HD Duricef 1000 mg PO BID x 4 weeks, EOT 7/2/24.  ID will not continue to follow, but please reach out with any questions or concerns.     Anti-infectives:   Cefepime 6/3-  Zosyn 6/3  Vanc 6/3-6/4    Subjective:   Interval History: Patient seen resting in bed. Denies any acute complaints. Updated on ID plan.     Patient is a 48 y.o. female who presented to ED with complaint of back pain on 6/3/24. She recently underwent L5-S1 TLIF on 5/8/24. She had been doing well postoperatively until she fell on 5/22. She landed on incision site. MRI revealed no acute abnormalities, but she continued to have worsening back pain. She presented to ED multiple times prior to admission. In ED she was found to have drainage from incision site. She had WBC 6.5, ESR 36. CT revealed abscess of posterior lumbar soft tissue/paraspinal musculature without findings to suggest extension to the spinal canal. Neurosurgery recommended transfer downtown. She was taken to OR on 6/4 where she underwent I&D, per op note small opening seen at top of wound with fluid and devitalized cultures removed, fascia intact, cultures sent +SA. Previous wound swab cx sent on 6/3 + MSSA. Admission blood cultures negative.  Patient seen resting in bed. Reports extensive history of former R hip replacement and prior disc space fusions. She also reports cage placement in lumbar spine about 19 years prior.     Allergies   Allergen Reactions    Ketorolac Hives     Migraine, dizziness  Injections only    Oxycodone Headaches     Nausea, dizziness and headaches; pt tolerates hydrocodone instead    Clomiphene Nausea And Vomiting     severe        Review of Systems:  A

## 2024-06-06 NOTE — CARE COORDINATION
Patient's inpatient plan of care and transitions of care planning reviewed in IDT rounds.  Patient is scheduled for surgery today and therapy will evaluate post-operativeley when patient is medically appropriate.  CM will continue to follow for possible transitions of care needs.

## 2024-06-06 NOTE — PLAN OF CARE
Problem: Discharge Planning  Goal: Discharge to home or other facility with appropriate resources  6/6/2024 0917 by Rita Lovett RN  Outcome: Progressing  6/5/2024 2133 by Courtney Dixon RN  Outcome: Progressing     Problem: Safety - Adult  Goal: Free from fall injury  6/6/2024 0917 by Rita Lovett RN  Outcome: Progressing  6/5/2024 2133 by Courtney Dixon RN  Outcome: Progressing  Flowsheets (Taken 6/5/2024 2005)  Free From Fall Injury: Instruct family/caregiver on patient safety     Problem: ABCDS Injury Assessment  Goal: Absence of physical injury  6/6/2024 0917 by Rita Lovett RN  Outcome: Progressing  6/5/2024 2133 by Courtney Dixon RN  Outcome: Progressing     Problem: Pain  Goal: Verbalizes/displays adequate comfort level or baseline comfort level  6/6/2024 0917 by Rita Lovett RN  Outcome: Progressing  6/5/2024 2133 by Courtney Dixon RN  Outcome: Progressing     Problem: Neurosensory - Adult  Goal: Achieves stable or improved neurological status  6/6/2024 0917 by Rita Lovett RN  Outcome: Progressing  6/5/2024 2133 by Courtney Dixon RN  Outcome: Progressing  Goal: Achieves maximal functionality and self care  6/6/2024 0917 by Rita Lovett RN  Outcome: Progressing  6/5/2024 2133 by Courtney Dixon RN  Outcome: Progressing     Problem: Respiratory - Adult  Goal: Achieves optimal ventilation and oxygenation  6/6/2024 0917 by Rita Lovett RN  Outcome: Progressing  6/5/2024 2133 by Courtney Dixon RN  Outcome: Progressing     Problem: Cardiovascular - Adult  Goal: Maintains optimal cardiac output and hemodynamic stability  6/6/2024 0917 by Rita Lovett RN  Outcome: Progressing  6/5/2024 2133 by Courtney Dixon RN  Outcome: Progressing     Problem: Skin/Tissue Integrity - Adult  Goal: Skin integrity remains intact  6/6/2024 0917 by Rita Lovett RN  Outcome: Progressing  6/5/2024 2133 by Courtney Dixon RN  Outcome: Progressing  Goal: Incisions, wounds, or drain sites healing  without S/S of infection  6/6/2024 0917 by Rita Lovett RN  Outcome: Progressing  6/5/2024 2133 by Courtney Dixon RN  Outcome: Progressing     Problem: Gastrointestinal - Adult  Goal: Minimal or absence of nausea and vomiting  6/6/2024 0917 by Rita Lovett RN  Outcome: Progressing  6/5/2024 2133 by Courtney Dixon RN  Outcome: Progressing  Goal: Maintains or returns to baseline bowel function  6/6/2024 0917 by Rita Lovett RN  Outcome: Progressing  6/5/2024 2133 by Courtney Dixon RN  Outcome: Progressing  Goal: Maintains adequate nutritional intake  6/6/2024 0917 by Rita Lovett RN  Outcome: Progressing  6/5/2024 2133 by Courtney Dixon RN  Outcome: Progressing     Problem: Genitourinary - Adult  Goal: Absence of urinary retention  6/6/2024 0917 by Rita Lovett RN  Outcome: Progressing  6/5/2024 2133 by Courtney Dixon RN  Outcome: Progressing     Problem: Infection - Adult  Goal: Absence of infection at discharge  6/6/2024 0917 by Rita Lovett RN  Outcome: Progressing  6/5/2024 2133 by Courtney Dixon RN  Outcome: Progressing     Problem: Metabolic/Fluid and Electrolytes - Adult  Goal: Electrolytes maintained within normal limits  6/6/2024 0917 by Rita Lovett RN  Outcome: Progressing  6/5/2024 2133 by Courtney Dixon RN  Outcome: Progressing  Goal: Hemodynamic stability and optimal renal function maintained  6/6/2024 0917 by Rita Lovett RN  Outcome: Progressing  6/5/2024 2133 by Courtney Dixon RN  Outcome: Progressing     Problem: Hematologic - Adult  Goal: Maintains hematologic stability  6/6/2024 0917 by Rita Lovett RN  Outcome: Progressing  6/5/2024 2133 by Courtney Dixon RN  Outcome: Progressing     Problem: Musculoskeletal - Adult  Goal: Return mobility to safest level of function  6/6/2024 0917 by Rita Lovett RN  Outcome: Progressing  6/5/2024 2133 by Courtney Dixon RN  Outcome: Progressing  Goal: Maintain proper alignment of affected body part  6/6/2024 0917 by Rachell  MARION Salinas  Outcome: Progressing  6/5/2024 2133 by Courtney Dixon RN  Outcome: Progressing  Goal: Return ADL status to a safe level of function  6/6/2024 0917 by Rita Lovett RN  Outcome: Progressing  6/5/2024 2133 by Courtney Dixon, RN  Outcome: Progressing

## 2024-06-06 NOTE — CARE COORDINATION
Therapy has been unable to work with patient today secondary to uncontrolled pain.  Therapy recommendations from yesterday were for no needs at discharge.  I.D. recommending PO antibiotics at discharge.  Patient is insured with a managed Medicare plan and is current with her PCP.  No anticipated CM need snoted at this time.      Please consult CM for any noted transitions of care needs.       06/06/24 7748   Service Assessment   Patient Orientation Alert and Oriented   Cognition Alert   History Provided By Medical Record   Primary Caregiver Self   Accompanied By/Relationship N/A   Support Systems Spouse/Significant Other   Patient's Healthcare Decision Maker is: Legal Next of Kin   PCP Verified by CM Yes   Last Visit to PCP Within last 3 months   Prior Functional Level Independent in ADLs/IADLs   Current Functional Level Independent in ADLs/IADLs   Can patient return to prior living arrangement Yes   Ability to make needs known: Good   Family able to assist with home care needs: Yes   Would you like for me to discuss the discharge plan with any other family members/significant others, and if so, who? No   Financial Resources Medicare   Community Resources None   CM/SW Referral Other (see comment)  (No CM referral)   Social/Functional History   Lives With Family   Type of Home House   Home Layout Two level;Bed/Bath upstairs   Bathroom Shower/Tub Walk-in shower   Home Equipment Walker - Rolling   Active  Yes

## 2024-06-07 VITALS
RESPIRATION RATE: 16 BRPM | HEIGHT: 67 IN | HEART RATE: 66 BPM | WEIGHT: 190.69 LBS | SYSTOLIC BLOOD PRESSURE: 118 MMHG | BODY MASS INDEX: 29.93 KG/M2 | TEMPERATURE: 97.4 F | OXYGEN SATURATION: 95 % | DIASTOLIC BLOOD PRESSURE: 88 MMHG

## 2024-06-07 LAB
ALBUMIN SERPL-MCNC: 2.7 G/DL (ref 3.5–5)
ALBUMIN/GLOB SERPL: 0.8 (ref 1–1.9)
ALP SERPL-CCNC: 190 U/L (ref 35–104)
ALT SERPL-CCNC: 24 U/L (ref 12–65)
ANION GAP SERPL CALC-SCNC: 9 MMOL/L (ref 9–18)
AST SERPL-CCNC: 21 U/L (ref 15–37)
BACTERIA SPEC CULT: NORMAL
BASOPHILS # BLD: 0.1 K/UL (ref 0–0.2)
BASOPHILS NFR BLD: 1 % (ref 0–2)
BILIRUB SERPL-MCNC: <0.2 MG/DL (ref 0–1.2)
BUN SERPL-MCNC: 13 MG/DL (ref 6–23)
CALCIUM SERPL-MCNC: 9.1 MG/DL (ref 8.8–10.2)
CHLORIDE SERPL-SCNC: 102 MMOL/L (ref 98–107)
CO2 SERPL-SCNC: 28 MMOL/L (ref 20–28)
CREAT SERPL-MCNC: 0.49 MG/DL (ref 0.6–1.1)
DIFFERENTIAL METHOD BLD: ABNORMAL
EOSINOPHIL # BLD: 0.4 K/UL (ref 0–0.8)
EOSINOPHIL NFR BLD: 5 % (ref 0.5–7.8)
ERYTHROCYTE [DISTWIDTH] IN BLOOD BY AUTOMATED COUNT: 14.9 % (ref 11.9–14.6)
GLOBULIN SER CALC-MCNC: 3.4 G/DL (ref 2.3–3.5)
GLUCOSE SERPL-MCNC: 80 MG/DL (ref 70–99)
HCT VFR BLD AUTO: 36 % (ref 35.8–46.3)
HGB BLD-MCNC: 11.2 G/DL (ref 11.7–15.4)
IMM GRANULOCYTES # BLD AUTO: 0.1 K/UL (ref 0–0.5)
IMM GRANULOCYTES NFR BLD AUTO: 1 % (ref 0–5)
LYMPHOCYTES # BLD: 3 K/UL (ref 0.5–4.6)
LYMPHOCYTES NFR BLD: 44 % (ref 13–44)
MCH RBC QN AUTO: 27.3 PG (ref 26.1–32.9)
MCHC RBC AUTO-ENTMCNC: 31.1 G/DL (ref 31.4–35)
MCV RBC AUTO: 87.8 FL (ref 82–102)
MONOCYTES # BLD: 0.5 K/UL (ref 0.1–1.3)
MONOCYTES NFR BLD: 7 % (ref 4–12)
NEUTS SEG # BLD: 2.9 K/UL (ref 1.7–8.2)
NEUTS SEG NFR BLD: 42 % (ref 43–78)
NRBC # BLD: 0 K/UL (ref 0–0.2)
PLATELET # BLD AUTO: 386 K/UL (ref 150–450)
PMV BLD AUTO: 8.8 FL (ref 9.4–12.3)
POTASSIUM SERPL-SCNC: 4.1 MMOL/L (ref 3.5–5.1)
PROT SERPL-MCNC: 6.1 G/DL (ref 6.3–8.2)
RBC # BLD AUTO: 4.1 M/UL (ref 4.05–5.2)
SERVICE CMNT-IMP: NORMAL
SODIUM SERPL-SCNC: 139 MMOL/L (ref 136–145)
WBC # BLD AUTO: 6.9 K/UL (ref 4.3–11.1)

## 2024-06-07 PROCEDURE — 6370000000 HC RX 637 (ALT 250 FOR IP): Performed by: HOSPITALIST

## 2024-06-07 PROCEDURE — 85025 COMPLETE CBC W/AUTO DIFF WBC: CPT

## 2024-06-07 PROCEDURE — 80053 COMPREHEN METABOLIC PANEL: CPT

## 2024-06-07 PROCEDURE — 6360000002 HC RX W HCPCS: Performed by: HOSPITALIST

## 2024-06-07 PROCEDURE — 2580000003 HC RX 258: Performed by: NEUROLOGICAL SURGERY

## 2024-06-07 PROCEDURE — 6370000000 HC RX 637 (ALT 250 FOR IP): Performed by: NEUROLOGICAL SURGERY

## 2024-06-07 PROCEDURE — 36415 COLL VENOUS BLD VENIPUNCTURE: CPT

## 2024-06-07 PROCEDURE — 2580000003 HC RX 258: Performed by: HOSPITALIST

## 2024-06-07 PROCEDURE — 99024 POSTOP FOLLOW-UP VISIT: CPT | Performed by: NURSE PRACTITIONER

## 2024-06-07 RX ORDER — CEFADROXIL 1000 MG/1
1 TABLET ORAL 2 TIMES DAILY
Qty: 52 TABLET | Refills: 0 | Status: SHIPPED | OUTPATIENT
Start: 2024-06-07 | End: 2024-07-03

## 2024-06-07 RX ORDER — L. ACIDOPHILUS/L.BULGARICUS 100MM CELL
1 GRANULES IN PACKET (EA) ORAL 2 TIMES DAILY
Qty: 52 PACKET | Refills: 0 | Status: SHIPPED | OUTPATIENT
Start: 2024-06-07 | End: 2024-07-03

## 2024-06-07 RX ORDER — HYDROCODONE BITARTRATE AND ACETAMINOPHEN 7.5; 325 MG/1; MG/1
1 TABLET ORAL EVERY 6 HOURS PRN
Qty: 20 TABLET | Refills: 0 | Status: SHIPPED | OUTPATIENT
Start: 2024-06-07 | End: 2024-06-12

## 2024-06-07 RX ADMIN — SODIUM CHLORIDE, PRESERVATIVE FREE 10 ML: 5 INJECTION INTRAVENOUS at 08:27

## 2024-06-07 RX ADMIN — CEFAZOLIN 2000 MG: 10 INJECTION, POWDER, FOR SOLUTION INTRAVENOUS at 04:23

## 2024-06-07 RX ADMIN — HYDROCODONE BITARTRATE AND ACETAMINOPHEN 1 TABLET: 10; 325 TABLET ORAL at 08:26

## 2024-06-07 RX ADMIN — MORPHINE SULFATE 2 MG: 2 INJECTION, SOLUTION INTRAMUSCULAR; INTRAVENOUS at 04:32

## 2024-06-07 RX ADMIN — ESTRADIOL 1 MG: 1 TABLET ORAL at 08:26

## 2024-06-07 RX ADMIN — PANTOPRAZOLE SODIUM 40 MG: 40 TABLET, DELAYED RELEASE ORAL at 04:32

## 2024-06-07 RX ADMIN — NALOXEGOL OXALATE 25 MG: 25 TABLET, FILM COATED ORAL at 05:24

## 2024-06-07 RX ADMIN — HYDROCODONE BITARTRATE AND ACETAMINOPHEN 1 TABLET: 10; 325 TABLET ORAL at 02:20

## 2024-06-07 ASSESSMENT — PAIN DESCRIPTION - LOCATION
LOCATION: BACK

## 2024-06-07 ASSESSMENT — PAIN SCALES - GENERAL
PAINLEVEL_OUTOF10: 0
PAINLEVEL_OUTOF10: 8
PAINLEVEL_OUTOF10: 0
PAINLEVEL_OUTOF10: 7
PAINLEVEL_OUTOF10: 7

## 2024-06-07 ASSESSMENT — PAIN DESCRIPTION - ORIENTATION
ORIENTATION: LOWER
ORIENTATION: MID
ORIENTATION: LOWER

## 2024-06-07 ASSESSMENT — PAIN - FUNCTIONAL ASSESSMENT
PAIN_FUNCTIONAL_ASSESSMENT: PREVENTS OR INTERFERES SOME ACTIVE ACTIVITIES AND ADLS
PAIN_FUNCTIONAL_ASSESSMENT: PREVENTS OR INTERFERES SOME ACTIVE ACTIVITIES AND ADLS

## 2024-06-07 ASSESSMENT — PAIN DESCRIPTION - PAIN TYPE
TYPE: ACUTE PAIN;SURGICAL PAIN
TYPE: ACUTE PAIN;SURGICAL PAIN

## 2024-06-07 ASSESSMENT — PAIN DESCRIPTION - DESCRIPTORS
DESCRIPTORS: DISCOMFORT;ACHING
DESCRIPTORS: DISCOMFORT;ACHING
DESCRIPTORS: ACHING

## 2024-06-07 ASSESSMENT — PAIN DESCRIPTION - ONSET
ONSET: GRADUAL
ONSET: GRADUAL

## 2024-06-07 ASSESSMENT — PAIN DESCRIPTION - FREQUENCY
FREQUENCY: INTERMITTENT
FREQUENCY: INTERMITTENT

## 2024-06-07 NOTE — PROGRESS NOTES
Physician Progress Note      PATIENT:               HOLDEN GILLIAM  CSN #:                  [id number]  :                       1975  ADMIT DATE:       6/3/2024 6:23 PM  DISCH DATE:  RESPONDING  PROVIDER #:        Tao Rodas MD          QUERY TEXT:    Dr Correa,    Patient admitted with Paraspinal abscess. Per Op note dated  documentation   of debridement. To accurately reflect the procedure performed please document   if debridement was excisional or nonexcisional and the deepest depth of tissue   removed as down to and including:      The medical record reflects the following:  Risk Factors: 47yo, GERD, mood disorder  Clinical Indicators: lumbar wound washout, Debridement documented in Op note  Treatment: Imaging, lab monitoring, IV ATBs    Thank You!  Courtney Gonzalez RN, CRCR  RN Clinical Documentation Integrity  Options provided:  -- Nonexcisional debridement of skin  -- Excisional debridement of skin  -- Nonexcisional debridement of subcutaneous tissue  -- Excisional debridement of subcutaneous tissue  -- Nonexcisional debridement of fascia  -- Excisional debridement of fascia  -- Nonexcisional debridement of muscle  -- Excisional debridement of muscle  -- Other - I will add my own diagnosis  -- Disagree - Not applicable / Not valid  -- Disagree - Clinically unable to determine / Unknown  -- Refer to Clinical Documentation Reviewer    PROVIDER RESPONSE TEXT:    Excisional debridement of subcutaneous tissue of lumbar spine was performed   during procedure on .    Query created by: Courtney Gonzalez on 2024 2:10 PM      Electronically signed by:  Tao Rodas MD 2024 9:18 AM

## 2024-06-07 NOTE — DISCHARGE SUMMARY
Hospitalist Discharge Summary   Admit Date:  6/3/2024  6:23 PM   DC Note date: 2024  Name:  Nidia Thomas   Age:  48 y.o.  Sex:  female  :  1975   MRN:  [id number]   Room:  [facility]  PCP:  Mery Lockhart MD    Presenting Complaint: No chief complaint on file.     Initial Admission Diagnosis: Abscess in epidural space of spine [G06.1]     Problem List for this Hospitalization (present on admission):    Principal Problem:    Paraspinal abscess (HCC)  Active Problems:    Lumbar radiculopathy    History of lumbar fusion    Major depressive disorder, recurrent, severe without psychotic features (HCC)  Resolved Problems:    * No resolved hospital problems. *      Hospital Course:    This is a 48 y.o. female with past medical history of insomnia and mood disorder who presented to the ED on 6/3/2024 with cc of back pain.  She underwent L5-S1 TLIF with Dr Max on 24.  Patient had been doing well postop until she had a fall around  and landed on the incision site.  She had an MRI a couple days later that showed no acute abnormalities.  She continued to have worsening back pain and was seen in the ED on  and .  Her pain continued to worsen and on 6/3, she noted drainage from the incision site and return to the ED.  In the ED, labs were mostly unremarkable with WBC 6.5 and ESR 36.  CT lumbar spine with contrast showed abscess of the posterior lumbar soft tissue/paraspinal musculature without findings to suggest extension to the spinal canal.  Neurosurgery was consulted and recommended transfer to [facility] for admission. Patient underwent surgery  with wound irrigation and debridement in her lower back.  Perioperative cultures were positive for MSSA.  ID was consulted.  Patient was on cefazolin while inpatient.  This was switched to Duricef 1 g p.o. twice daily for 4 weeks with end of therapy 2024.  Other chronic medical conditions which are stable included GERD, restless leg  [[id number]]  (Abnormal)  (Susceptibility) Collected: 06/03/24 1302    Order Status: Completed Specimen: Misc. Wound Updated: 06/05/24 0755     Special Requests INCISION        Gram Stain FEW WBCS SEEN         NO DEFINITE ORGANISM SEEN        Culture       MODERATE Staphylococcus aureus          Susceptibility        Staphylococcus aureus      BACTERIAL SUSCEPTIBILITY PANEL EYAD      clindamycin <=0.25 ug/mL Sensitive      oxacillin 0.5 ug/mL Sensitive      rifampin <=0.5 ug/mL Sensitive  [1]       tetracycline <=1 ug/mL Sensitive      trimethoprim-sulfamethoxazole <=10 ug/mL Sensitive      vancomycin 1 ug/mL Sensitive                   [1]  Rifampin is not to be used for mono-therapy.                   Culture, Anaerobic [[id number]] Collected: 06/03/24 1302    Order Status: Completed Specimen: Back Updated: 06/07/24 0746     Special Requests INCISION        Culture       NO ANAEROBIC GROWTH IN 3 DAYS                  All Labs from Last 24 Hrs:  Recent Results (from the past 24 hour(s))   CBC with Auto Differential    Collection Time: 06/07/24  4:12 AM   Result Value Ref Range    WBC 6.9 4.3 - 11.1 K/uL    RBC 4.10 4.05 - 5.2 M/uL    Hemoglobin 11.2 (L) 11.7 - 15.4 g/dL    Hematocrit 36.0 35.8 - 46.3 %    MCV 87.8 82 - 102 FL    MCH 27.3 26.1 - 32.9 PG    MCHC 31.1 (L) 31.4 - 35.0 g/dL    RDW 14.9 (H) 11.9 - 14.6 %    Platelets 386 150 - 450 K/uL    MPV 8.8 (L) 9.4 - 12.3 FL    nRBC 0.00 0.0 - 0.2 K/uL    Differential Type AUTOMATED      Neutrophils % 42 (L) 43 - 78 %    Lymphocytes % 44 13 - 44 %    Monocytes % 7 4.0 - 12.0 %    Eosinophils % 5 0.5 - 7.8 %    Basophils % 1 0.0 - 2.0 %    Immature Granulocytes % 1 0.0 - 5.0 %    Neutrophils Absolute 2.9 1.7 - 8.2 K/UL    Lymphocytes Absolute 3.0 0.5 - 4.6 K/UL    Monocytes Absolute 0.5 0.1 - 1.3 K/UL    Eosinophils Absolute 0.4 0.0 - 0.8 K/UL    Basophils Absolute 0.1 0.0 - 0.2 K/UL    Immature Granulocytes Absolute 0.1 0.0 - 0.5 K/UL   Comprehensive Metabolic Panel  w/ Reflex to MG    Collection Time: 06/07/24  4:12 AM   Result Value Ref Range    Sodium 139 136 - 145 mmol/L    Potassium 4.1 3.5 - 5.1 mmol/L    Chloride 102 98 - 107 mmol/L    CO2 28 20 - 28 mmol/L    Anion Gap 9 9 - 18 mmol/L    Glucose 80 70 - 99 mg/dL    BUN 13 6 - 23 MG/DL    Creatinine 0.49 (L) 0.60 - 1.10 MG/DL    Est, Glom Filt Rate >90 >60 ml/min/1.73m2    Calcium 9.1 8.8 - 10.2 MG/DL    Total Bilirubin <0.2 0.0 - 1.2 MG/DL    ALT 24 12 - 65 U/L    AST 21 15 - 37 U/L    Alk Phosphatase 190 (H) 35 - 104 U/L    Total Protein 6.1 (L) 6.3 - 8.2 g/dL    Albumin 2.7 (L) 3.5 - 5.0 g/dL    Globulin 3.4 2.3 - 3.5 g/dL    Albumin/Globulin Ratio 0.8 (L) 1.0 - 1.9         No results for input(s): \"COVID19\" in the last 72 hours.    Recent Vital Data:  Patient Vitals for the past 24 hrs:   Temp Pulse Resp BP SpO2   06/07/24 0826 -- -- 16 -- --   06/07/24 0731 97.4 °F (36.3 °C) 66 17 118/88 95 %   06/06/24 1944 98.2 °F (36.8 °C) 99 18 119/88 100 %       Oxygen Therapy  SpO2: 95 %  Pulse via Oximetry: 73 beats per minute  Pulse Oximeter Device Mode: Continuous  Pulse Oximeter Device Location: Right, Finger  O2 Device: None (Room air)  O2 Flow Rate (L/min): 3 L/min    Estimated body mass index is 29.87 kg/m² as calculated from the following:    Height as of this encounter: 1.702 m (5' 7\").    Weight as of this encounter: 86.5 kg (190 lb 11 oz).    Intake/Output Summary (Last 24 hours) at 6/7/2024 1047  Last data filed at 6/6/2024 1743  Gross per 24 hour   Intake 462 ml   Output --   Net 462 ml         Physical Exam:    General:    Well nourished.  No overt distress  Head:  Normocephalic, atraumatic  Eyes:  Sclerae appear normal.  Pupils equally round.    HENT:  Nares appear normal, no drainage.  Moist mucous membranes  Neck:  No restricted ROM.  Trachea midline  CV:   RRR.  No m/r/g.  No JVD  Lungs:   CTAB.  No wheezing, rhonchi, or rales.  Respirations even, unlabored  Abdomen:   Soft, nontender, nondistended.   Post-op

## 2024-06-07 NOTE — PROGRESS NOTES
[facility]    Assessment: S/P lumbar wound washout    POD#3    Plan:  -continue medical management  -cultures  have resulted with Staph aureus and MSSA  -remove drain  -ID made final antibiotic recommendations   -pt contacted by [facility] and has appointment for Tuesday  -pt clear to discharge home from neurosurgery perspective    Subjective:    Objective:  Afebrile  VSS  GCS15  Moving all extremities  Incision CDI      Patient Vitals for the past 12 hrs:   Temp Pulse Resp BP SpO2   06/07/24 0826 -- -- 16 -- --   06/07/24 0731 97.4 °F (36.3 °C) 66 17 118/88 95 %          Recent Results (from the past 24 hour(s))   CBC with Auto Differential    Collection Time: 06/07/24  4:12 AM   Result Value Ref Range    WBC 6.9 4.3 - 11.1 K/uL    RBC 4.10 4.05 - 5.2 M/uL    Hemoglobin 11.2 (L) 11.7 - 15.4 g/dL    Hematocrit 36.0 35.8 - 46.3 %    MCV 87.8 82 - 102 FL    MCH 27.3 26.1 - 32.9 PG    MCHC 31.1 (L) 31.4 - 35.0 g/dL    RDW 14.9 (H) 11.9 - 14.6 %    Platelets 386 150 - 450 K/uL    MPV 8.8 (L) 9.4 - 12.3 FL    nRBC 0.00 0.0 - 0.2 K/uL    Differential Type AUTOMATED      Neutrophils % 42 (L) 43 - 78 %    Lymphocytes % 44 13 - 44 %    Monocytes % 7 4.0 - 12.0 %    Eosinophils % 5 0.5 - 7.8 %    Basophils % 1 0.0 - 2.0 %    Immature Granulocytes % 1 0.0 - 5.0 %    Neutrophils Absolute 2.9 1.7 - 8.2 K/UL    Lymphocytes Absolute 3.0 0.5 - 4.6 K/UL    Monocytes Absolute 0.5 0.1 - 1.3 K/UL    Eosinophils Absolute 0.4 0.0 - 0.8 K/UL    Basophils Absolute 0.1 0.0 - 0.2 K/UL    Immature Granulocytes Absolute 0.1 0.0 - 0.5 K/UL   Comprehensive Metabolic Panel w/ Reflex to MG    Collection Time: 06/07/24  4:12 AM   Result Value Ref Range    Sodium 139 136 - 145 mmol/L    Potassium 4.1 3.5 - 5.1 mmol/L    Chloride 102 98 - 107 mmol/L    CO2 28 20 - 28 mmol/L    Anion Gap 9 9 - 18 mmol/L    Glucose 80 70 - 99 mg/dL    BUN 13 6 - 23 MG/DL    Creatinine 0.49 (L) 0.60 - 1.10 MG/DL    Est, Glom Filt

## 2024-06-08 LAB
BACTERIA SPEC CULT: NORMAL
BACTERIA SPEC CULT: NORMAL
SERVICE CMNT-IMP: NORMAL
SERVICE CMNT-IMP: NORMAL

## 2024-06-10 LAB
BACTERIA SPEC CULT: NORMAL
SERVICE CMNT-IMP: NORMAL

## 2024-06-12 ENCOUNTER — TELEPHONE (OUTPATIENT)
Dept: NEUROSURGERY | Age: 49
End: 2024-06-12

## 2024-06-12 NOTE — TELEPHONE ENCOUNTER
marcus MRN:  [id number] doenst have any new post op visits from second surgery, I cancelled first surgery appts- can you please make new appts for her? Thank you.

## 2024-06-14 LAB
FUNGUS SMEAR: NORMAL
FUNGUS SMEAR: NORMAL
SPECIMEN SOURCE: NORMAL
SPECIMEN SOURCE: NORMAL

## 2024-06-20 ENCOUNTER — OFFICE VISIT (OUTPATIENT)
Dept: NEUROSURGERY | Age: 49
End: 2024-06-20

## 2024-06-20 VITALS
HEART RATE: 99 BPM | WEIGHT: 185 LBS | BODY MASS INDEX: 29.03 KG/M2 | TEMPERATURE: 97.3 F | SYSTOLIC BLOOD PRESSURE: 155 MMHG | DIASTOLIC BLOOD PRESSURE: 105 MMHG | OXYGEN SATURATION: 97 % | HEIGHT: 67 IN

## 2024-06-20 DIAGNOSIS — T81.49XA POSTOPERATIVE WOUND ABSCESS: Primary | ICD-10-CM

## 2024-06-20 PROCEDURE — 99024 POSTOP FOLLOW-UP VISIT: CPT | Performed by: NEUROLOGICAL SURGERY

## 2024-06-20 RX ORDER — CYCLOBENZAPRINE HCL 10 MG
10 TABLET ORAL 3 TIMES DAILY PRN
Qty: 60 TABLET | Refills: 1 | Status: SHIPPED | OUTPATIENT
Start: 2024-06-20

## 2024-06-20 NOTE — ASSESSMENT & PLAN NOTE
Her wound is looking great.  She still has 2 weeks worth of oral antibiotics.  We will bring her back to see Dr. Max in a month.

## 2024-06-20 NOTE — PROGRESS NOTES
discectomy/fusion with a frontal approach    LUMBAR SPINE SURGERY Left 05/08/2024    left-sided L3-L4 minimally invasive hemilaminectomy, medial facetectomy and discectomy performed by Gallo aMx MD at [facility] MAIN OR    ORTHOPEDIC SURGERY Left 5/2011    left foot surg    OTHER SURGICAL HISTORY  2004    left hand I&D    OTHER SURGICAL HISTORY  5/2013    EUA- sphincterectomy    MICHAEL AND BSO (CERVIX REMOVED)      TOTAL COLECTOMY  09/2017    subtotal colectomy for idiopathic constipation    TOTAL HIP ARTHROPLASTY Right      Current Outpatient Medications   Medication Sig Dispense Refill    cefadroxil (DURICEF) 1 g tablet Take 1 tablet by mouth 2 times daily for 26 days 52 tablet 0    acidophilus (LACTINEX/FLORANEX) Take 1 packet by mouth 2 times daily for 26 days 52 packet 0    clonazePAM (KLONOPIN) 2 MG tablet Take 1 tablet by mouth nightly as needed for Anxiety.      estradiol (ESTRACE) 1 MG tablet Take 1 tablet by mouth daily      zolpidem (AMBIEN CR) 12.5 MG extended release tablet Take 1 tablet by mouth nightly.      Multiple Vitamins-Minerals (CENTRUM SILVER ADULT 50+) TABS Take 1 tablet by mouth daily      NONFORMULARY Take 4 tablets by mouth daily Nutrafol      rOPINIRole (REQUIP) 1 MG tablet Take 1 tablet by mouth nightly      omeprazole (PRILOSEC) 40 MG delayed release capsule Take 1 capsule by mouth daily      celecoxib (CELEBREX) 200 MG capsule Take 1 capsule by mouth 2 times daily      ondansetron (ZOFRAN-ODT) 4 MG disintegrating tablet Take 1 tablet by mouth 3 times daily as needed for Nausea or Vomiting 21 tablet 0    ALPRAZolam (XANAX) 1 MG tablet Take 1 tablet by mouth 2 times daily as needed for Sleep.      amoxicillin (AMOXIL) 500 MG tablet TAKE 4 TABS 30-60 MINUTES BEFORE PROCEDURE      naloxone (NARCAN) 4 MG/0.1ML LIQD nasal spray 1 spray by Nasal route as needed for Opioid Reversal 1 each 0     No current facility-administered medications for this visit.      Allergies   Allergen Reactions

## 2024-07-03 LAB
FUNGAL CULT/SMEAR: NORMAL
FUNGAL CULT/SMEAR: NORMAL
FUNGUS (MYCOLOGY) CULTURE: NEGATIVE
FUNGUS (MYCOLOGY) CULTURE: NEGATIVE
FUNGUS SMEAR: NORMAL
FUNGUS SMEAR: NORMAL
REFLEX TO ID: NORMAL
REFLEX TO ID: NORMAL
SPECIMEN PROCESSING: NORMAL
SPECIMEN PROCESSING: NORMAL
SPECIMEN SOURCE: NORMAL

## 2024-07-29 DIAGNOSIS — M51.26 LUMBAR DISC HERNIATION: ICD-10-CM

## 2024-07-29 DIAGNOSIS — T81.49XA POSTOPERATIVE WOUND ABSCESS: Primary | ICD-10-CM

## 2024-07-29 DIAGNOSIS — M54.16 LUMBAR RADICULOPATHY: ICD-10-CM

## 2024-07-29 RX ORDER — CYCLOBENZAPRINE HCL 10 MG
10 TABLET ORAL 3 TIMES DAILY PRN
Qty: 90 TABLET | Refills: 1 | Status: SHIPPED | OUTPATIENT
Start: 2024-07-29

## 2024-07-30 ENCOUNTER — TELEPHONE (OUTPATIENT)
Dept: NEUROSURGERY | Age: 49
End: 2024-07-30

## 2024-07-30 NOTE — TELEPHONE ENCOUNTER
Spoke to patient advised her to await 6 months for any dental cleaning due to prior history of infection

## 2024-07-30 NOTE — TELEPHONE ENCOUNTER
Patient called, states she called yesterday to have muscle relaxers refilled.   Her PCP called in 3 month supply so that is no longer needed.     Also-patient is having a dental procedure and ask if she needs antibiotic.

## 2025-04-25 ENCOUNTER — TRANSCRIBE ORDERS (OUTPATIENT)
Dept: SCHEDULING | Age: 50
End: 2025-04-25

## 2025-04-25 DIAGNOSIS — Z12.31 ENCOUNTER FOR SCREENING MAMMOGRAM FOR BREAST CANCER: Primary | ICD-10-CM

## (undated) DEVICE — ADHESIVE SKIN CLOSURE WND 8.661X1.5 IN 22 CM LIQUIBAND SECUR

## (undated) DEVICE — AGENT HEMOSTATIC SURGIFLOW MATRIX KIT W/THROMBIN

## (undated) DEVICE — RESERVOIR,SUCTION,100CC,SILICONE: Brand: MEDLINE

## (undated) DEVICE — STERILE HOOK LOCK LATEX FREE ELASTIC BANDAGE 6INX5YD: Brand: HOOK LOCK™

## (undated) DEVICE — BIPOLAR SEALER 23-112-1 AQM 6.0: Brand: AQUAMANTYS ®

## (undated) DEVICE — STERILE LATEX POWDER FREE SURGICAL GLOVES WITH HYDROGEL COATING: Brand: PROTEXIS

## (undated) DEVICE — T-DRAPE,EXTREMITY,STERILE: Brand: MEDLINE

## (undated) DEVICE — BLADE ES ELASTOMERIC COAT INSUL DURABLE BEND UPTO 90DEG

## (undated) DEVICE — DRAPE TWL SURG 16X26IN BLU ORB04] ALLCARE INC]

## (undated) DEVICE — SUTURE VICRYL SZ 3-0 L18IN ABSRB UD L26MM SH 1/2 CIR J864D

## (undated) DEVICE — CONTAINER,SPECIMEN,O.R.STRL,4.5OZ: Brand: MEDLINE

## (undated) DEVICE — ABSORBENT, WATERPROOF, BACTERIA PROOF FILM DRESSING: Brand: OPSITE POST OP 20X10CM CTN 20

## (undated) DEVICE — GARMENT,MEDLINE,DVT,INT,CALF,LG, GEN2: Brand: MEDLINE

## (undated) DEVICE — SET IRRIG DST FLX M CONN

## (undated) DEVICE — ABSORBENT, WATERPROOF, BACTERIA PROOF FILM DRESSING: Brand: OPSITE POST OP 9.5X8.5CM CTN 20

## (undated) DEVICE — BIPOLAR SEALER 23-113-1 AQM 2.3 OM NEURO: Brand: AQUAMANTYS ®

## (undated) DEVICE — SYR LR LCK 1ML GRAD NSAF 30ML --

## (undated) DEVICE — SPONGE,NEURO,0.5"X0.5",XR,STRL,10/PK: Brand: MEDLINE

## (undated) DEVICE — SOLUTION IRRIG 1000ML 0.9% SOD CHL USP POUR PLAS BTL

## (undated) DEVICE — SUTURE MONOCRYL SZ 2-0 L27IN ABSRB VLT CT-2 L26MM 1/2 CIR Y333H

## (undated) DEVICE — Z DISCONTINUED USE 2860665 SUTURE ETHILON SZ 2-0 L30IN NONABSORBABLE BLK L36MM FSLX 3/8 1674H

## (undated) DEVICE — MARKER SURG SKIN UTIL BLK REG TIP NONSMEARING W/ 6IN RUL

## (undated) DEVICE — Z DISCONTINUED USE 2220281 SUTURE VICRYL SZ 2-0 L18IN ABSRB UD L26MM CP-2 1/2 CIR REV J762D

## (undated) DEVICE — JACKSON TABLE POSITIONER KIT: Brand: MEDLINE INDUSTRIES, INC.

## (undated) DEVICE — SPONGE,NEURO,0.5"X1",XR,STRL,LF,10/PK: Brand: MEDLINE

## (undated) DEVICE — SURGICAL PROCEDURE PACK BASIC ST FRANCIS

## (undated) DEVICE — Z DISCONTINUED SPONGE LAPAROTOMY W18XL18IN WHITE STRUNG RADIOPAQUE STERILE

## (undated) DEVICE — 4-PORT MANIFOLD: Brand: NEPTUNE 2

## (undated) DEVICE — (D)STRIP SKN CLSR 0.5X4IN WHT --

## (undated) DEVICE — PADDING CAST W4INXL4YD ST COT COHESIVE HND TEARABLE SPEC

## (undated) DEVICE — BANDAGE COMPR SELF ADH 5 YDX6 IN TAN STRL PREMIERPRO LF

## (undated) DEVICE — DRAIN SURG 10FR L1/8IN RND CHN FULL FLUT HEAT POLISHED PERF

## (undated) DEVICE — SUTURE VICRYL + SZ 2-0 L18IN ABSRB UD CP-2 L26MM 1/2 CIR REV VCP762D

## (undated) DEVICE — STOCKINETTE,IMPERVIOUS,12X48,STERILE: Brand: MEDLINE

## (undated) DEVICE — POSITIONER HD REST FOAM CMFRT TCH

## (undated) DEVICE — KNIFE SURG 10MM GRFT DISP FOR ACL RECON

## (undated) DEVICE — SYRINGE IRRIG 60ML SFT PLIABLE BLB EZ TO GRP 1 HND USE W/

## (undated) DEVICE — GLOVE SURG SZ 75 L12IN FNGR THK79MIL GRN LTX FREE

## (undated) DEVICE — CRADLE POS 3X5X24IN RASPBERRY ARM PRONE FOAM DISP

## (undated) DEVICE — SKIN MARKER,REGULAR TIP WITH RULER AND LABELS: Brand: DEVON

## (undated) DEVICE — WEREWOLF FLOW 90 COBLATION WAND: Brand: COBLATION

## (undated) DEVICE — SOLUTION IRRIG 3000ML 0.9% SOD CHL FLX CONT 0797208] ICU MEDICAL INC]

## (undated) DEVICE — GLOVE ORANGE PI 7   MSG9070

## (undated) DEVICE — Device

## (undated) DEVICE — SYRINGE MED 30ML STD CLR PLAS LUERLOCK TIP N CTRL DISP

## (undated) DEVICE — TUBING, SUCTION, 1/4" X 10', STRAIGHT: Brand: MEDLINE

## (undated) DEVICE — APPLICATOR MEDICATED 26 CC SOLUTION HI LT ORNG CHLORAPREP

## (undated) DEVICE — SYRINGE MED 10ML LUERLOCK TIP W/O SFTY DISP

## (undated) DEVICE — SPINE NEURO: Brand: MEDLINE INDUSTRIES, INC.

## (undated) DEVICE — WAX SURG 2.5GM HEMSTAT BNE BEESWAX PARAFFIN ISO PALMITATE

## (undated) DEVICE — UNIVERSAL DRAPES: Brand: MEDLINE INDUSTRIES, INC.

## (undated) DEVICE — 3M™ TEGADERM™ TRANSPARENT FILM DRESSING FRAME STYLE, 1628, 6 IN X 8 IN (15 CM X 20 CM), 10/CT 8CT/CASE: Brand: 3M™ TEGADERM™

## (undated) DEVICE — C-ARM: Brand: UNBRANDED

## (undated) DEVICE — GARMENT,MEDLINE,DVT,INT,CALF,MED, GEN2: Brand: MEDLINE

## (undated) DEVICE — ELECTRODE BLDE L6.5IN CAUT EXT DISP

## (undated) DEVICE — DRAIN SURG FLAT W7MMXL20CM FULL PERF

## (undated) DEVICE — DRESSING HYDROFIBER AQUACEL AG ADVANTAGE 3.5X6 IN

## (undated) DEVICE — BLADE SHV CUT MENIS AGG + 4MM --

## (undated) DEVICE — CARBIDE MATCH HEAD

## (undated) DEVICE — SUTURE ETHILON SZ 2-0 L18IN NONABSORBABLE BLK L19MM PS-2 PRIM 593H

## (undated) DEVICE — PREMIUM WET SKIN PREP TRAY: Brand: MEDLINE INDUSTRIES, INC.

## (undated) DEVICE — GAUZE,SPONGE,4"X4",16PLY,STRL,LF,10/TRAY: Brand: MEDLINE

## (undated) DEVICE — WET SKIN PREP TRAY: Brand: MEDLINE INDUSTRIES, INC.

## (undated) DEVICE — SURGIFOAM SPNG SZ 100

## (undated) DEVICE — JACKSON-PRATT 100CC BULB RESERVOIR: Brand: CARDINAL HEALTH

## (undated) DEVICE — INTENDED FOR TISSUE SEPARATION, AND OTHER PROCEDURES THAT REQUIRE A SHARP SURGICAL BLADE TO PUNCTURE OR CUT.: Brand: BARD-PARKER ® STAINLESS STEEL BLADES

## (undated) DEVICE — SYRINGE MED 50ML LUERLOCK TIP

## (undated) DEVICE — ZIMMER® STERILE DISPOSABLE TOURNIQUET CUFF WITH PLC, DUAL PORT, SINGLE BLADDER, 24 IN. (61 CM)

## (undated) DEVICE — KIT EVAC 0.13IN RECT TB DIA10FR 400CC PVC 3 SPR Y CONN DRN

## (undated) DEVICE — DISPOSABLE STANDARD BIPOLAR FORCEPS, NON-STICK,: Brand: SPETZLER-MALIS

## (undated) DEVICE — (D)PREP SKN CHLRAPRP APPL 26ML -- CONVERT TO ITEM 371833